# Patient Record
Sex: MALE | Race: WHITE
[De-identification: names, ages, dates, MRNs, and addresses within clinical notes are randomized per-mention and may not be internally consistent; named-entity substitution may affect disease eponyms.]

---

## 2017-04-07 ENCOUNTER — PRIOR ORIGINAL RECORDS (OUTPATIENT)
Dept: OTHER | Age: 77
End: 2017-04-07

## 2017-08-04 ENCOUNTER — PRIOR ORIGINAL RECORDS (OUTPATIENT)
Dept: OTHER | Age: 77
End: 2017-08-04

## 2017-12-08 ENCOUNTER — PRIOR ORIGINAL RECORDS (OUTPATIENT)
Dept: OTHER | Age: 77
End: 2017-12-08

## 2017-12-12 ENCOUNTER — PRIOR ORIGINAL RECORDS (OUTPATIENT)
Dept: OTHER | Age: 77
End: 2017-12-12

## 2017-12-20 LAB
BUN: 26 MG/DL
BUN: 30 MG/DL
CALCIUM: 9.6 MG/DL
CHLORIDE: 104 MEQ/L
CHLORIDE: 108 MEQ/L
CHOLESTEROL, TOTAL: 141 MG/DL
CHOLESTEROL, TOTAL: 161 MG/DL
CREATININE, SERUM: 1.44 MG/DL
CREATININE, SERUM: 1.5 MG/DL
GLUCOSE: 112 MG/DL
GLUCOSE: 115 MG/DL
HDL CHOLESTEROL: 33 MG/DL
HDL CHOLESTEROL: 37 MG/DL
HEMATOCRIT: 42 %
HEMATOCRIT: 46.1 %
HEMOGLOBIN A1C: 6.4 %
HEMOGLOBIN A1C: 6.7 %
HEMOGLOBIN: 13.9 G/DL
HEMOGLOBIN: 14.9 G/DL
LDL CHOLESTEROL: 77 MG/DL
MAGNESIUM: 1.9 MG/DL
MAGNESIUM: 2.1 MG/DL
PLATELETS: 241 K/UL
PLATELETS: 241 K/UL
POTASSIUM, SERUM: 4 MEQ/L
POTASSIUM, SERUM: 4.3 MEQ/L
RED BLOOD COUNT: 4.49 X 10-6/U
RED BLOOD COUNT: 4.79 X 10-6/U
SGOT (AST): 28 IU/L
SGOT (AST): 29 IU/L
SGPT (ALT): 40 IU/L
SGPT (ALT): 62 IU/L
SODIUM: 141 MEQ/L
SODIUM: 143 MEQ/L
THYROID STIMULATING HORMONE: 3.34 MLU/L
THYROID STIMULATING HORMONE: 5.23 MLU/L
TRIGLYCERIDES: 135 MG/DL
TRIGLYCERIDES: 352 MG/DL
URIC ACID: 6.6 MG/DL
VITAMIN D 25-OH: 33 NG/ML
WHITE BLOOD COUNT: 4.31 X 10-3/U
WHITE BLOOD COUNT: 4.54 X 10-3/U

## 2018-11-09 ENCOUNTER — PRIOR ORIGINAL RECORDS (OUTPATIENT)
Dept: OTHER | Age: 78
End: 2018-11-09

## 2018-12-14 ENCOUNTER — MYAURORA ACCOUNT LINK (OUTPATIENT)
Dept: OTHER | Age: 78
End: 2018-12-14

## 2018-12-14 ENCOUNTER — PRIOR ORIGINAL RECORDS (OUTPATIENT)
Dept: OTHER | Age: 78
End: 2018-12-14

## 2018-12-31 LAB
BUN: 24 MG/DL
CHLORIDE: 105 MEQ/L
CREATININE, SERUM: 1.46 MG/DL
GLUCOSE: 105 MG/DL
HEMATOCRIT: 45.3 %
HEMOGLOBIN: 15 G/DL
MAGNESIUM: 2 MG/DL
PLATELETS: 242 K/UL
POTASSIUM, SERUM: 3.8 MEQ/L
RED BLOOD COUNT: 4.73 X 10-6/U
SODIUM: 141 MEQ/L
WHITE BLOOD COUNT: 5.2 X 10-3/U

## 2019-02-28 VITALS
WEIGHT: 261 LBS | HEART RATE: 68 BPM | DIASTOLIC BLOOD PRESSURE: 60 MMHG | HEIGHT: 73 IN | SYSTOLIC BLOOD PRESSURE: 152 MMHG | BODY MASS INDEX: 34.59 KG/M2

## 2019-02-28 VITALS
DIASTOLIC BLOOD PRESSURE: 78 MMHG | WEIGHT: 248 LBS | SYSTOLIC BLOOD PRESSURE: 136 MMHG | HEART RATE: 60 BPM | HEIGHT: 73 IN | BODY MASS INDEX: 32.87 KG/M2

## 2019-04-19 RX ORDER — METOPROLOL SUCCINATE 25 MG/1
TABLET, EXTENDED RELEASE ORAL
COMMUNITY
Start: 2017-10-10 | End: 2020-03-03 | Stop reason: SDUPTHER

## 2019-04-19 RX ORDER — ATORVASTATIN CALCIUM 20 MG/1
TABLET, FILM COATED ORAL
COMMUNITY
Start: 2016-12-09

## 2019-04-19 RX ORDER — CHLORAL HYDRATE 500 MG
CAPSULE ORAL
COMMUNITY
Start: 2011-03-04 | End: 2019-12-02 | Stop reason: ALTCHOICE

## 2019-04-19 RX ORDER — FENOFIBRATE 145 MG/1
TABLET, COATED ORAL
COMMUNITY
Start: 2011-10-14

## 2019-04-19 RX ORDER — AMLODIPINE BESYLATE 10 MG/1
TABLET ORAL
COMMUNITY
Start: 2018-02-07 | End: 2020-03-03 | Stop reason: SDUPTHER

## 2019-04-19 RX ORDER — LISINOPRIL AND HYDROCHLOROTHIAZIDE 20; 12.5 MG/1; MG/1
TABLET ORAL
COMMUNITY
Start: 2018-05-14 | End: 2019-10-13 | Stop reason: SDUPTHER

## 2019-04-19 RX ORDER — ASPIRIN 325 MG
TABLET ORAL
COMMUNITY
Start: 2010-02-05

## 2019-04-19 RX ORDER — HYDRALAZINE HYDROCHLORIDE 50 MG/1
TABLET, FILM COATED ORAL
COMMUNITY

## 2019-08-05 LAB
25(OH)D3+25(OH)D2 SERPL-MCNC: 40 NG/ML
ABSOLUTE IMMATURE GRANULOCYTES (OFFPRE24): NORMAL
ALBUMIN SERPL-MCNC: NORMAL G/DL
ALBUMIN/GLOB SERPL: NORMAL {RATIO}
ALP SERPL-CCNC: NORMAL U/L
ALT SERPL-CCNC: 43 U/L
ANION GAP SERPL CALC-SCNC: NORMAL MMOL/L
AST SERPL-CCNC: 25 U/L
BASO+EOS+MONOS # BLD: NORMAL 10*3/UL
BASO+EOS+MONOS NFR BLD: NORMAL %
BASOPHILS # BLD: NORMAL 10*3/UL
BASOPHILS NFR BLD: NORMAL %
BILIRUB SERPL-MCNC: NORMAL MG/DL
BUN SERPL-MCNC: 23 MG/DL
BUN/CREAT SERPL: NORMAL
CALCIUM SERPL-MCNC: NORMAL MG/DL
CHLORIDE SERPL-SCNC: 106 MMOL/L
CHOLEST SERPL-MCNC: 186 MG/DL
CHOLEST/HDLC SERPL: NORMAL {RATIO}
CO2 SERPL-SCNC: NORMAL MMOL/L
CREAT SERPL-MCNC: 1.35 MG/DL
DIFFERENTIAL METHOD BLD: NORMAL
EOSINOPHIL # BLD: NORMAL 10*3/UL
EOSINOPHIL NFR BLD: NORMAL %
ERYTHROCYTE [DISTWIDTH] IN BLOOD: NORMAL %
EST. AVERAGE GLUCOSE BLD GHB EST-MCNC: NORMAL MG/DL
GLOBULIN SER-MCNC: NORMAL G/DL
GLUCOSE SERPL-MCNC: 107 MG/DL
HBA1C MFR BLD: 6.7 %
HBA1C MFR BLD: NORMAL % (ref 4.5–5.6)
HCT VFR BLD CALC: 44.8 %
HDLC SERPL-MCNC: 35 MG/DL
HGB BLD-MCNC: 14.4 G/DL
IMMATURE GRANULOCYTES (OFFPRE25): NORMAL
LDLC SERPL CALC-MCNC: 101 MG/DL
LENGTH OF FAST TIME PATIENT: NORMAL H
LENGTH OF FAST TIME PATIENT: NORMAL H
LYMPHOCYTES # BLD: NORMAL 10*3/UL
LYMPHOCYTES NFR BLD: NORMAL %
MAGNESIUM SERPL-MCNC: 2.1 MG/DL
MCH RBC QN AUTO: NORMAL PG
MCHC RBC AUTO-ENTMCNC: NORMAL G/DL
MCV RBC AUTO: NORMAL FL
MONOCYTES # BLD: NORMAL 10*3/UL
MONOCYTES NFR BLD: NORMAL %
MPV (OFFPRE2): NORMAL
NEUTROPHILS # BLD: NORMAL 10*3/UL
NEUTROPHILS NFR BLD: NORMAL %
NONHDLC SERPL-MCNC: NORMAL MG/DL
NRBC BLD MANUAL-RTO: NORMAL %
PLAT MORPH BLD: NORMAL
PLATELET # BLD: 267 10*3/UL
POTASSIUM SERPL-SCNC: 4.2 MMOL/L
PROT SERPL-MCNC: NORMAL G/DL
RBC # BLD: 4.71 10*6/UL
RBC MORPH BLD: NORMAL
SODIUM SERPL-SCNC: 137 MMOL/L
T4 FREE SERPL-MCNC: 0.8 NG/DL
TRIGL SERPL-MCNC: 249 MG/DL
TSH SERPL-ACNC: 4.97 M[IU]/L
URATE SERPL-MCNC: 5.7 MG/DL
VLDLC SERPL CALC-MCNC: NORMAL MG/DL
WBC # BLD: 5.25 10*3/UL
WBC MORPH BLD: NORMAL

## 2019-10-17 RX ORDER — LISINOPRIL AND HYDROCHLOROTHIAZIDE 20; 12.5 MG/1; MG/1
TABLET ORAL
Qty: 180 TABLET | Refills: 0 | Status: SHIPPED | OUTPATIENT
Start: 2019-10-17 | End: 2019-10-21 | Stop reason: SDUPTHER

## 2019-10-22 RX ORDER — LISINOPRIL AND HYDROCHLOROTHIAZIDE 20; 12.5 MG/1; MG/1
TABLET ORAL
Qty: 180 TABLET | Refills: 0 | Status: SHIPPED | OUTPATIENT
Start: 2019-10-22 | End: 2020-03-03 | Stop reason: SDUPTHER

## 2019-12-16 ENCOUNTER — APPOINTMENT (OUTPATIENT)
Dept: CARDIOLOGY | Age: 79
End: 2019-12-16

## 2019-12-16 ENCOUNTER — OFFICE VISIT (OUTPATIENT)
Dept: CARDIOLOGY | Age: 79
End: 2019-12-16

## 2019-12-16 VITALS
BODY MASS INDEX: 34.06 KG/M2 | WEIGHT: 257 LBS | DIASTOLIC BLOOD PRESSURE: 66 MMHG | HEART RATE: 72 BPM | HEIGHT: 73 IN | SYSTOLIC BLOOD PRESSURE: 136 MMHG

## 2019-12-16 DIAGNOSIS — E78.2 MIXED HYPERLIPIDEMIA: Primary | ICD-10-CM

## 2019-12-16 DIAGNOSIS — I10 ESSENTIAL HYPERTENSION, BENIGN: ICD-10-CM

## 2019-12-16 DIAGNOSIS — Z95.5 S/P PRIMARY ANGIOPLASTY WITH CORONARY STENT: ICD-10-CM

## 2019-12-16 DIAGNOSIS — I25.10 CORONARY ARTERY DISEASE INVOLVING NATIVE HEART WITHOUT ANGINA PECTORIS, UNSPECIFIED VESSEL OR LESION TYPE: ICD-10-CM

## 2019-12-16 PROCEDURE — 99214 OFFICE O/P EST MOD 30 MIN: CPT | Performed by: INTERNAL MEDICINE

## 2019-12-16 RX ORDER — ECHINACEA 400 MG
1000 CAPSULE ORAL
COMMUNITY

## 2019-12-16 RX ORDER — GLUCOSAM/CHONDRO/HERB 149/HYAL 750-100 MG
1200 TABLET ORAL
COMMUNITY

## 2019-12-16 RX ORDER — LEVOTHYROXINE SODIUM 0.03 MG/1
25 TABLET ORAL
COMMUNITY

## 2019-12-16 RX ORDER — MULTIVITAMIN
TABLET ORAL
COMMUNITY
Start: 2012-07-25

## 2019-12-16 RX ORDER — EZETIMIBE 10 MG/1
10 TABLET ORAL DAILY
Qty: 90 TABLET | Refills: 3 | Status: SHIPPED | OUTPATIENT
Start: 2019-12-16 | End: 2020-12-21

## 2019-12-16 SDOH — SOCIAL STABILITY: SOCIAL NETWORK: ARE YOU MARRIED, WIDOWED, DIVORCED, SEPARATED, NEVER MARRIED, OR LIVING WITH A PARTNER?: MARRIED

## 2019-12-16 SDOH — HEALTH STABILITY: PHYSICAL HEALTH: ON AVERAGE, HOW MANY MINUTES DO YOU ENGAGE IN EXERCISE AT THIS LEVEL?: 60 MIN

## 2019-12-16 SDOH — HEALTH STABILITY: PHYSICAL HEALTH: ON AVERAGE, HOW MANY DAYS PER WEEK DO YOU ENGAGE IN MODERATE TO STRENUOUS EXERCISE (LIKE A BRISK WALK)?: 7 DAYS

## 2019-12-16 ASSESSMENT — ENCOUNTER SYMPTOMS
COUGH: 0
FEVER: 0
SUSPICIOUS LESIONS: 0
HEMOPTYSIS: 0
ALLERGIC/IMMUNOLOGIC COMMENTS: NO NEW FOOD ALLERGIES
WEIGHT LOSS: 0
WEIGHT GAIN: 0
BRUISES/BLEEDS EASILY: 0
HEMATOCHEZIA: 0
CHILLS: 0

## 2019-12-30 ENCOUNTER — CLINICAL ABSTRACT (OUTPATIENT)
Dept: CARDIOLOGY | Age: 79
End: 2019-12-30

## 2020-01-01 ENCOUNTER — EXTERNAL RECORD (OUTPATIENT)
Dept: HEALTH INFORMATION MANAGEMENT | Facility: OTHER | Age: 80
End: 2020-01-01

## 2020-03-04 RX ORDER — LISINOPRIL AND HYDROCHLOROTHIAZIDE 20; 12.5 MG/1; MG/1
TABLET ORAL
Qty: 180 TABLET | Refills: 3 | Status: SHIPPED | OUTPATIENT
Start: 2020-03-04

## 2020-03-04 RX ORDER — AMLODIPINE BESYLATE 10 MG/1
TABLET ORAL
Qty: 90 TABLET | Refills: 3 | Status: SHIPPED | OUTPATIENT
Start: 2020-03-04 | End: 2021-03-15

## 2020-03-04 RX ORDER — METOPROLOL SUCCINATE 25 MG/1
TABLET, EXTENDED RELEASE ORAL
Qty: 90 TABLET | Refills: 3 | Status: SHIPPED | OUTPATIENT
Start: 2020-03-04 | End: 2021-04-28

## 2020-12-04 ENCOUNTER — TELEPHONE (OUTPATIENT)
Dept: CARDIOLOGY | Age: 80
End: 2020-12-04

## 2020-12-04 ENCOUNTER — OFFICE VISIT (OUTPATIENT)
Dept: CARDIOLOGY | Age: 80
End: 2020-12-04

## 2020-12-04 VITALS
SYSTOLIC BLOOD PRESSURE: 142 MMHG | HEIGHT: 73 IN | DIASTOLIC BLOOD PRESSURE: 64 MMHG | BODY MASS INDEX: 35.39 KG/M2 | WEIGHT: 267 LBS | HEART RATE: 85 BPM

## 2020-12-04 DIAGNOSIS — E78.5 DYSLIPIDEMIA: ICD-10-CM

## 2020-12-04 DIAGNOSIS — I25.10 CORONARY ARTERY DISEASE INVOLVING NATIVE HEART WITHOUT ANGINA PECTORIS, UNSPECIFIED VESSEL OR LESION TYPE: Primary | ICD-10-CM

## 2020-12-04 DIAGNOSIS — I10 ESSENTIAL HYPERTENSION, BENIGN: ICD-10-CM

## 2020-12-04 DIAGNOSIS — Z95.5 S/P PRIMARY ANGIOPLASTY WITH CORONARY STENT: ICD-10-CM

## 2020-12-04 PROCEDURE — 99214 OFFICE O/P EST MOD 30 MIN: CPT | Performed by: INTERNAL MEDICINE

## 2020-12-04 SDOH — SOCIAL STABILITY: SOCIAL NETWORK: ARE YOU MARRIED, WIDOWED, DIVORCED, SEPARATED, NEVER MARRIED, OR LIVING WITH A PARTNER?: MARRIED

## 2020-12-04 SDOH — HEALTH STABILITY: PHYSICAL HEALTH: ON AVERAGE, HOW MANY MINUTES DO YOU ENGAGE IN EXERCISE AT THIS LEVEL?: 60 MIN

## 2020-12-04 SDOH — HEALTH STABILITY: PHYSICAL HEALTH: ON AVERAGE, HOW MANY DAYS PER WEEK DO YOU ENGAGE IN MODERATE TO STRENUOUS EXERCISE (LIKE A BRISK WALK)?: 7 DAYS

## 2020-12-04 ASSESSMENT — PATIENT HEALTH QUESTIONNAIRE - PHQ9
CLINICAL INTERPRETATION OF PHQ2 SCORE: NO FURTHER SCREENING NEEDED
1. LITTLE INTEREST OR PLEASURE IN DOING THINGS: NOT AT ALL
2. FEELING DOWN, DEPRESSED OR HOPELESS: NOT AT ALL
SUM OF ALL RESPONSES TO PHQ9 QUESTIONS 1 AND 2: 0
CLINICAL INTERPRETATION OF PHQ9 SCORE: NO FURTHER SCREENING NEEDED
SUM OF ALL RESPONSES TO PHQ9 QUESTIONS 1 AND 2: 0

## 2020-12-04 ASSESSMENT — ENCOUNTER SYMPTOMS
ALLERGIC/IMMUNOLOGIC COMMENTS: NO NEW FOOD ALLERGIES
HEMOPTYSIS: 0
COUGH: 0
HEMATOCHEZIA: 0
WEIGHT LOSS: 0
BRUISES/BLEEDS EASILY: 0
CHILLS: 0
FEVER: 0
WEIGHT GAIN: 0
SUSPICIOUS LESIONS: 0

## 2020-12-09 ENCOUNTER — TELEPHONE (OUTPATIENT)
Dept: CARDIOLOGY | Age: 80
End: 2020-12-09

## 2020-12-21 RX ORDER — EZETIMIBE 10 MG/1
TABLET ORAL
Qty: 90 TABLET | Refills: 3 | Status: SHIPPED | OUTPATIENT
Start: 2020-12-21

## 2021-03-15 RX ORDER — AMLODIPINE BESYLATE 10 MG/1
TABLET ORAL
Qty: 90 TABLET | Refills: 0 | Status: SHIPPED | OUTPATIENT
Start: 2021-03-15 | End: 2021-06-14 | Stop reason: SDUPTHER

## 2021-04-28 RX ORDER — METOPROLOL SUCCINATE 25 MG/1
TABLET, EXTENDED RELEASE ORAL
Qty: 90 TABLET | Refills: 3 | Status: SHIPPED | OUTPATIENT
Start: 2021-04-28

## 2021-06-14 RX ORDER — AMLODIPINE BESYLATE 10 MG/1
10 TABLET ORAL DAILY
Qty: 90 TABLET | Refills: 2 | Status: SHIPPED | OUTPATIENT
Start: 2021-06-14

## 2021-12-10 ENCOUNTER — APPOINTMENT (OUTPATIENT)
Dept: CARDIOLOGY | Age: 81
End: 2021-12-10

## 2023-01-02 ENCOUNTER — APPOINTMENT (OUTPATIENT)
Dept: GENERAL RADIOLOGY | Facility: HOSPITAL | Age: 83
End: 2023-01-02
Payer: MEDICARE

## 2023-01-02 ENCOUNTER — HOSPITAL ENCOUNTER (INPATIENT)
Facility: HOSPITAL | Age: 83
LOS: 2 days | Discharge: HOME | End: 2023-01-04
Attending: EMERGENCY MEDICINE | Admitting: HOSPITALIST
Payer: MEDICARE

## 2023-01-02 ENCOUNTER — HOSPITAL ENCOUNTER (INPATIENT)
Facility: HOSPITAL | Age: 83
LOS: 2 days | Discharge: HOME | End: 2023-01-04
Attending: EMERGENCY MEDICINE
Payer: MEDICARE

## 2023-01-02 DIAGNOSIS — I47.1 SUSTAINED SVT (HCC): Primary | ICD-10-CM

## 2023-01-02 PROBLEM — I48.91 ATRIAL FIBRILLATION (HCC): Status: ACTIVE | Noted: 2023-01-02

## 2023-01-02 PROBLEM — I47.10 SUSTAINED SVT (HCC): Status: ACTIVE | Noted: 2023-01-02

## 2023-01-02 LAB
ALBUMIN SERPL-MCNC: 4.2 G/DL (ref 3.4–5)
ALBUMIN/GLOB SERPL: 1.2 {RATIO} (ref 1–2)
ALP LIVER SERPL-CCNC: 38 U/L
ALT SERPL-CCNC: 35 U/L
ANION GAP SERPL CALC-SCNC: 10 MMOL/L (ref 0–18)
ANION GAP SERPL CALC-SCNC: 6 MMOL/L (ref 0–18)
APTT PPP: 25.8 SECONDS (ref 23.3–35.6)
APTT PPP: 26.1 SECONDS (ref 23.3–35.6)
AST SERPL-CCNC: 31 U/L (ref 15–37)
BASOPHILS # BLD AUTO: 0.04 X10(3) UL (ref 0–0.2)
BASOPHILS NFR BLD AUTO: 0.7 %
BILIRUB SERPL-MCNC: 0.6 MG/DL (ref 0.1–2)
BILIRUB UR QL STRIP.AUTO: NEGATIVE
BUN BLD-MCNC: 21 MG/DL (ref 7–18)
BUN BLD-MCNC: 22 MG/DL (ref 7–18)
CALCIUM BLD-MCNC: 9.2 MG/DL (ref 8.5–10.1)
CALCIUM BLD-MCNC: 9.8 MG/DL (ref 8.5–10.1)
CHLORIDE SERPL-SCNC: 107 MMOL/L (ref 98–112)
CHLORIDE SERPL-SCNC: 110 MMOL/L (ref 98–112)
CLARITY UR REFRACT.AUTO: CLEAR
CO2 SERPL-SCNC: 24 MMOL/L (ref 21–32)
CO2 SERPL-SCNC: 26 MMOL/L (ref 21–32)
COLOR UR AUTO: YELLOW
CREAT BLD-MCNC: 1.41 MG/DL
CREAT BLD-MCNC: 1.51 MG/DL
EOSINOPHIL # BLD AUTO: 0.06 X10(3) UL (ref 0–0.7)
EOSINOPHIL NFR BLD AUTO: 1 %
ERYTHROCYTE [DISTWIDTH] IN BLOOD BY AUTOMATED COUNT: 12.5 %
ERYTHROCYTE [DISTWIDTH] IN BLOOD BY AUTOMATED COUNT: 12.5 %
EST. AVERAGE GLUCOSE BLD GHB EST-MCNC: 146 MG/DL (ref 68–126)
GFR SERPLBLD BASED ON 1.73 SQ M-ARVRAT: 46 ML/MIN/1.73M2 (ref 60–?)
GFR SERPLBLD BASED ON 1.73 SQ M-ARVRAT: 50 ML/MIN/1.73M2 (ref 60–?)
GLOBULIN PLAS-MCNC: 3.5 G/DL (ref 2.8–4.4)
GLUCOSE BLD-MCNC: 107 MG/DL (ref 70–99)
GLUCOSE BLD-MCNC: 118 MG/DL (ref 70–99)
GLUCOSE BLD-MCNC: 132 MG/DL (ref 70–99)
GLUCOSE BLD-MCNC: 138 MG/DL (ref 70–99)
GLUCOSE BLD-MCNC: 152 MG/DL (ref 70–99)
GLUCOSE UR STRIP.AUTO-MCNC: NEGATIVE MG/DL
HBA1C MFR BLD: 6.7 % (ref ?–5.7)
HCT VFR BLD AUTO: 39.7 %
HCT VFR BLD AUTO: 43.1 %
HGB BLD-MCNC: 13.2 G/DL
HGB BLD-MCNC: 14.8 G/DL
IMM GRANULOCYTES # BLD AUTO: 0.04 X10(3) UL (ref 0–1)
IMM GRANULOCYTES NFR BLD: 0.7 %
KETONES UR STRIP.AUTO-MCNC: NEGATIVE MG/DL
LEUKOCYTE ESTERASE UR QL STRIP.AUTO: NEGATIVE
LYMPHOCYTES # BLD AUTO: 1.93 X10(3) UL (ref 1–4)
LYMPHOCYTES NFR BLD AUTO: 33.4 %
MAGNESIUM SERPL-MCNC: 2.1 MG/DL (ref 1.6–2.6)
MCH RBC QN AUTO: 30.9 PG (ref 26–34)
MCH RBC QN AUTO: 31.4 PG (ref 26–34)
MCHC RBC AUTO-ENTMCNC: 33.2 G/DL (ref 31–37)
MCHC RBC AUTO-ENTMCNC: 34.3 G/DL (ref 31–37)
MCV RBC AUTO: 90 FL
MCV RBC AUTO: 94.3 FL
MONOCYTES # BLD AUTO: 0.59 X10(3) UL (ref 0.1–1)
MONOCYTES NFR BLD AUTO: 10.2 %
NEUTROPHILS # BLD AUTO: 3.12 X10 (3) UL (ref 1.5–7.7)
NEUTROPHILS # BLD AUTO: 3.12 X10(3) UL (ref 1.5–7.7)
NEUTROPHILS NFR BLD AUTO: 54 %
NITRITE UR QL STRIP.AUTO: NEGATIVE
OSMOLALITY SERPL CALC.SUM OF ELEC: 297 MOSM/KG (ref 275–295)
OSMOLALITY SERPL CALC.SUM OF ELEC: 298 MOSM/KG (ref 275–295)
PH UR STRIP.AUTO: 8.5 [PH] (ref 5–8)
PLATELET # BLD AUTO: 224 10(3)UL (ref 150–450)
PLATELET # BLD AUTO: 272 10(3)UL (ref 150–450)
POTASSIUM SERPL-SCNC: 3.6 MMOL/L (ref 3.5–5.1)
POTASSIUM SERPL-SCNC: 4.2 MMOL/L (ref 3.5–5.1)
PROT SERPL-MCNC: 7.7 G/DL (ref 6.4–8.2)
PROT UR STRIP.AUTO-MCNC: >=300 MG/DL
Q-T INTERVAL: 254 MS
QRS DURATION: 84 MS
QTC CALCULATION (BEZET): 444 MS
R AXIS: -13 DEGREES
RBC # BLD AUTO: 4.21 X10(6)UL
RBC # BLD AUTO: 4.79 X10(6)UL
RBC UR QL AUTO: NEGATIVE
SARS-COV-2 RNA RESP QL NAA+PROBE: NOT DETECTED
SODIUM SERPL-SCNC: 141 MMOL/L (ref 136–145)
SODIUM SERPL-SCNC: 142 MMOL/L (ref 136–145)
SP GR UR STRIP.AUTO: 1.02 (ref 1–1.03)
T AXIS: 136 DEGREES
T4 FREE SERPL-MCNC: 0.8 NG/DL (ref 0.8–1.7)
TROPONIN I HIGH SENSITIVITY: 49 NG/L
TROPONIN I HIGH SENSITIVITY: 55 NG/L
TSI SER-ACNC: 8.91 MIU/ML (ref 0.36–3.74)
UROBILINOGEN UR STRIP.AUTO-MCNC: 0.2 MG/DL
VENTRICULAR RATE: 184 BPM
WBC # BLD AUTO: 5.8 X10(3) UL (ref 4–11)
WBC # BLD AUTO: 6.6 X10(3) UL (ref 4–11)

## 2023-01-02 PROCEDURE — 99223 1ST HOSP IP/OBS HIGH 75: CPT | Performed by: STUDENT IN AN ORGANIZED HEALTH CARE EDUCATION/TRAINING PROGRAM

## 2023-01-02 PROCEDURE — 71045 X-RAY EXAM CHEST 1 VIEW: CPT | Performed by: EMERGENCY MEDICINE

## 2023-01-02 RX ORDER — ONDANSETRON 2 MG/ML
4 INJECTION INTRAMUSCULAR; INTRAVENOUS EVERY 6 HOURS PRN
Status: DISCONTINUED | OUTPATIENT
Start: 2023-01-02 | End: 2023-01-04

## 2023-01-02 RX ORDER — GUAIFENESIN 600 MG/1
600 TABLET, EXTENDED RELEASE ORAL 2 TIMES DAILY PRN
Status: DISCONTINUED | OUTPATIENT
Start: 2023-01-02 | End: 2023-01-04

## 2023-01-02 RX ORDER — HYDRALAZINE HYDROCHLORIDE 50 MG/1
50 TABLET, FILM COATED ORAL 2 TIMES DAILY
Status: DISCONTINUED | OUTPATIENT
Start: 2023-01-02 | End: 2023-01-04

## 2023-01-02 RX ORDER — ADENOSINE 3 MG/ML
12 INJECTION, SOLUTION INTRAVENOUS ONCE
Status: COMPLETED | OUTPATIENT
Start: 2023-01-02 | End: 2023-01-02

## 2023-01-02 RX ORDER — NICOTINE POLACRILEX 4 MG
15 LOZENGE BUCCAL
Status: DISCONTINUED | OUTPATIENT
Start: 2023-01-02 | End: 2023-01-04

## 2023-01-02 RX ORDER — LISINOPRIL AND HYDROCHLOROTHIAZIDE 20; 12.5 MG/1; MG/1
2 TABLET ORAL DAILY
Status: DISCONTINUED | OUTPATIENT
Start: 2023-01-02 | End: 2023-01-02

## 2023-01-02 RX ORDER — MELATONIN
3 NIGHTLY PRN
Status: DISCONTINUED | OUTPATIENT
Start: 2023-01-02 | End: 2023-01-04

## 2023-01-02 RX ORDER — ACETAMINOPHEN 325 MG/1
650 TABLET ORAL EVERY 4 HOURS PRN
Status: DISCONTINUED | OUTPATIENT
Start: 2023-01-02 | End: 2023-01-04

## 2023-01-02 RX ORDER — HYDRALAZINE HYDROCHLORIDE 25 MG/1
25 TABLET, FILM COATED ORAL 2 TIMES DAILY
Status: DISCONTINUED | OUTPATIENT
Start: 2023-01-02 | End: 2023-01-02

## 2023-01-02 RX ORDER — HEPARIN SODIUM 1000 [USP'U]/ML
5000 INJECTION, SOLUTION INTRAVENOUS; SUBCUTANEOUS ONCE
Status: COMPLETED | OUTPATIENT
Start: 2023-01-02 | End: 2023-01-02

## 2023-01-02 RX ORDER — METOPROLOL TARTRATE 50 MG/1
50 TABLET, FILM COATED ORAL
Status: DISCONTINUED | OUTPATIENT
Start: 2023-01-02 | End: 2023-01-04

## 2023-01-02 RX ORDER — BISACODYL 10 MG
10 SUPPOSITORY, RECTAL RECTAL
Status: DISCONTINUED | OUTPATIENT
Start: 2023-01-02 | End: 2023-01-04

## 2023-01-02 RX ORDER — LISINOPRIL AND HYDROCHLOROTHIAZIDE 20; 12.5 MG/1; MG/1
1 TABLET ORAL 2 TIMES DAILY
Status: DISCONTINUED | OUTPATIENT
Start: 2023-01-02 | End: 2023-01-02 | Stop reason: RX

## 2023-01-02 RX ORDER — LISINOPRIL AND HYDROCHLOROTHIAZIDE 20; 12.5 MG/1; MG/1
1 TABLET ORAL 2 TIMES DAILY
COMMUNITY

## 2023-01-02 RX ORDER — POLYETHYLENE GLYCOL 3350 17 G/17G
17 POWDER, FOR SOLUTION ORAL DAILY PRN
Status: DISCONTINUED | OUTPATIENT
Start: 2023-01-02 | End: 2023-01-04

## 2023-01-02 RX ORDER — ATORVASTATIN CALCIUM 20 MG/1
20 TABLET, FILM COATED ORAL NIGHTLY
Status: DISCONTINUED | OUTPATIENT
Start: 2023-01-02 | End: 2023-01-04

## 2023-01-02 RX ORDER — LEVOTHYROXINE SODIUM 0.03 MG/1
25 TABLET ORAL
Status: DISCONTINUED | OUTPATIENT
Start: 2023-01-03 | End: 2023-01-04

## 2023-01-02 RX ORDER — METOCLOPRAMIDE HYDROCHLORIDE 5 MG/ML
10 INJECTION INTRAMUSCULAR; INTRAVENOUS EVERY 8 HOURS PRN
Status: DISCONTINUED | OUTPATIENT
Start: 2023-01-02 | End: 2023-01-04

## 2023-01-02 RX ORDER — HYDROCODONE BITARTRATE AND ACETAMINOPHEN 5; 325 MG/1; MG/1
2 TABLET ORAL EVERY 4 HOURS PRN
Status: DISCONTINUED | OUTPATIENT
Start: 2023-01-02 | End: 2023-01-04

## 2023-01-02 RX ORDER — ADENOSINE 3 MG/ML
6 INJECTION, SOLUTION INTRAVENOUS ONCE
Status: COMPLETED | OUTPATIENT
Start: 2023-01-02 | End: 2023-01-02

## 2023-01-02 RX ORDER — FENOFIBRATE 134 MG/1
134 CAPSULE ORAL
Status: DISCONTINUED | OUTPATIENT
Start: 2023-01-03 | End: 2023-01-04

## 2023-01-02 RX ORDER — NICOTINE POLACRILEX 4 MG
30 LOZENGE BUCCAL
Status: DISCONTINUED | OUTPATIENT
Start: 2023-01-02 | End: 2023-01-04

## 2023-01-02 RX ORDER — HYDROCODONE BITARTRATE AND ACETAMINOPHEN 5; 325 MG/1; MG/1
1 TABLET ORAL EVERY 4 HOURS PRN
Status: DISCONTINUED | OUTPATIENT
Start: 2023-01-02 | End: 2023-01-04

## 2023-01-02 RX ORDER — SODIUM CHLORIDE 9 MG/ML
INJECTION, SOLUTION INTRAVENOUS CONTINUOUS
Status: ACTIVE | OUTPATIENT
Start: 2023-01-02 | End: 2023-01-02

## 2023-01-02 RX ORDER — HYDRALAZINE HYDROCHLORIDE 50 MG/1
50 TABLET, FILM COATED ORAL 2 TIMES DAILY
COMMUNITY

## 2023-01-02 RX ORDER — HEPARIN SODIUM AND DEXTROSE 10000; 5 [USP'U]/100ML; G/100ML
1000 INJECTION INTRAVENOUS ONCE
Status: DISCONTINUED | OUTPATIENT
Start: 2023-01-02 | End: 2023-01-04

## 2023-01-02 RX ORDER — AMLODIPINE BESYLATE 5 MG/1
10 TABLET ORAL DAILY
Status: DISCONTINUED | OUTPATIENT
Start: 2023-01-03 | End: 2023-01-04

## 2023-01-02 RX ORDER — SENNOSIDES 8.6 MG
17.2 TABLET ORAL NIGHTLY PRN
Status: DISCONTINUED | OUTPATIENT
Start: 2023-01-02 | End: 2023-01-04

## 2023-01-02 RX ORDER — HEPARIN SODIUM AND DEXTROSE 10000; 5 [USP'U]/100ML; G/100ML
INJECTION INTRAVENOUS CONTINUOUS
Status: DISCONTINUED | OUTPATIENT
Start: 2023-01-02 | End: 2023-01-03

## 2023-01-02 RX ORDER — BENZONATATE 100 MG/1
200 CAPSULE ORAL 3 TIMES DAILY PRN
Status: DISCONTINUED | OUTPATIENT
Start: 2023-01-02 | End: 2023-01-04

## 2023-01-02 RX ORDER — ADENOSINE 3 MG/ML
INJECTION, SOLUTION INTRAVENOUS
Status: COMPLETED
Start: 2023-01-02 | End: 2023-01-02

## 2023-01-02 RX ORDER — SODIUM PHOSPHATE, DIBASIC AND SODIUM PHOSPHATE, MONOBASIC 7; 19 G/133ML; G/133ML
1 ENEMA RECTAL ONCE AS NEEDED
Status: DISCONTINUED | OUTPATIENT
Start: 2023-01-02 | End: 2023-01-04

## 2023-01-02 RX ORDER — ASPIRIN 81 MG/1
81 TABLET, CHEWABLE ORAL DAILY
Status: DISCONTINUED | OUTPATIENT
Start: 2023-01-03 | End: 2023-01-04

## 2023-01-02 RX ORDER — DEXTROSE MONOHYDRATE 25 G/50ML
50 INJECTION, SOLUTION INTRAVENOUS
Status: DISCONTINUED | OUTPATIENT
Start: 2023-01-02 | End: 2023-01-04

## 2023-01-02 NOTE — ED QUICK NOTES
Orders for admission, patient is aware of plan and ready to go upstairs. Any questions, please call ED RN Lea Guillen  at extension [phone]. Vaccinated?  Yes  Type of COVID test sent: Rapid - negative  COVID Suspicion level: None      Titratable drug(s) infusing: Cardizem   Rate:5 mg/hr    LOC at time of transport: A/Ox4    Other pertinent information: N/A    CIWA score= N/A  NIH score= N/A

## 2023-01-02 NOTE — ED INITIAL ASSESSMENT (HPI)
Pt presents to the ED with complaints of sudden onset feeling dizzy with chest pain radiating to back and tingling to both arms. Pt states he took a full strength aspirin. Pt states symptoms have not improved. Pt awake and alert, skin w/d,resps reg/unlabored.

## 2023-01-03 ENCOUNTER — APPOINTMENT (OUTPATIENT)
Dept: CV DIAGNOSTICS | Facility: HOSPITAL | Age: 83
End: 2023-01-03
Attending: NURSE PRACTITIONER
Payer: MEDICARE

## 2023-01-03 PROBLEM — I47.1 SVT (SUPRAVENTRICULAR TACHYCARDIA) (HCC): Status: ACTIVE | Noted: 2023-01-02

## 2023-01-03 PROBLEM — I47.10 SVT (SUPRAVENTRICULAR TACHYCARDIA) (HCC): Status: ACTIVE | Noted: 2023-01-02

## 2023-01-03 LAB
ALBUMIN SERPL-MCNC: 3.7 G/DL (ref 3.4–5)
ALBUMIN/GLOB SERPL: 1.1 {RATIO} (ref 1–2)
ALP LIVER SERPL-CCNC: 26 U/L
ALT SERPL-CCNC: 33 U/L
ANION GAP SERPL CALC-SCNC: 3 MMOL/L (ref 0–18)
APTT PPP: 35 SECONDS (ref 23.3–35.6)
APTT PPP: 42.7 SECONDS (ref 23.3–35.6)
APTT PPP: 43.2 SECONDS (ref 23.3–35.6)
AST SERPL-CCNC: 25 U/L (ref 15–37)
ATRIAL RATE: 47 BPM
BASOPHILS # BLD AUTO: 0.04 X10(3) UL (ref 0–0.2)
BASOPHILS NFR BLD AUTO: 0.7 %
BILIRUB SERPL-MCNC: 0.5 MG/DL (ref 0.1–2)
BUN BLD-MCNC: 20 MG/DL (ref 7–18)
CALCIUM BLD-MCNC: 8.9 MG/DL (ref 8.5–10.1)
CHLORIDE SERPL-SCNC: 111 MMOL/L (ref 98–112)
CO2 SERPL-SCNC: 25 MMOL/L (ref 21–32)
CREAT BLD-MCNC: 1.31 MG/DL
EOSINOPHIL # BLD AUTO: 0.09 X10(3) UL (ref 0–0.7)
EOSINOPHIL NFR BLD AUTO: 1.7 %
ERYTHROCYTE [DISTWIDTH] IN BLOOD BY AUTOMATED COUNT: 12.6 %
GFR SERPLBLD BASED ON 1.73 SQ M-ARVRAT: 55 ML/MIN/1.73M2 (ref 60–?)
GLOBULIN PLAS-MCNC: 3.3 G/DL (ref 2.8–4.4)
GLUCOSE BLD-MCNC: 115 MG/DL (ref 70–99)
GLUCOSE BLD-MCNC: 117 MG/DL (ref 70–99)
GLUCOSE BLD-MCNC: 120 MG/DL (ref 70–99)
GLUCOSE BLD-MCNC: 138 MG/DL (ref 70–99)
GLUCOSE BLD-MCNC: 151 MG/DL (ref 70–99)
HCT VFR BLD AUTO: 38 %
HGB BLD-MCNC: 12.7 G/DL
IMM GRANULOCYTES # BLD AUTO: 0.04 X10(3) UL (ref 0–1)
IMM GRANULOCYTES NFR BLD: 0.7 %
LYMPHOCYTES # BLD AUTO: 1.34 X10(3) UL (ref 1–4)
LYMPHOCYTES NFR BLD AUTO: 24.8 %
MCH RBC QN AUTO: 31.4 PG (ref 26–34)
MCHC RBC AUTO-ENTMCNC: 33.4 G/DL (ref 31–37)
MCV RBC AUTO: 94.1 FL
MONOCYTES # BLD AUTO: 0.49 X10(3) UL (ref 0.1–1)
MONOCYTES NFR BLD AUTO: 9.1 %
NEUTROPHILS # BLD AUTO: 3.4 X10 (3) UL (ref 1.5–7.7)
NEUTROPHILS # BLD AUTO: 3.4 X10(3) UL (ref 1.5–7.7)
NEUTROPHILS NFR BLD AUTO: 63 %
OSMOLALITY SERPL CALC.SUM OF ELEC: 293 MOSM/KG (ref 275–295)
P AXIS: 70 DEGREES
P-R INTERVAL: 244 MS
PLATELET # BLD AUTO: 233 10(3)UL (ref 150–450)
PLATELET # BLD AUTO: 233 10(3)UL (ref 150–450)
POTASSIUM SERPL-SCNC: 3.5 MMOL/L (ref 3.5–5.1)
PROT SERPL-MCNC: 7 G/DL (ref 6.4–8.2)
Q-T INTERVAL: 472 MS
QRS DURATION: 102 MS
QTC CALCULATION (BEZET): 417 MS
R AXIS: -19 DEGREES
RBC # BLD AUTO: 4.04 X10(6)UL
SODIUM SERPL-SCNC: 139 MMOL/L (ref 136–145)
T AXIS: 42 DEGREES
VENTRICULAR RATE: 47 BPM
WBC # BLD AUTO: 5.4 X10(3) UL (ref 4–11)

## 2023-01-03 PROCEDURE — 93306 TTE W/DOPPLER COMPLETE: CPT | Performed by: NURSE PRACTITIONER

## 2023-01-03 PROCEDURE — 93017 CV STRESS TEST TRACING ONLY: CPT | Performed by: NURSE PRACTITIONER

## 2023-01-03 PROCEDURE — 93018 CV STRESS TEST I&R ONLY: CPT | Performed by: NURSE PRACTITIONER

## 2023-01-03 PROCEDURE — 78452 HT MUSCLE IMAGE SPECT MULT: CPT | Performed by: NURSE PRACTITIONER

## 2023-01-03 PROCEDURE — 99232 SBSQ HOSP IP/OBS MODERATE 35: CPT | Performed by: STUDENT IN AN ORGANIZED HEALTH CARE EDUCATION/TRAINING PROGRAM

## 2023-01-03 RX ORDER — HYDRALAZINE HYDROCHLORIDE 20 MG/ML
5 INJECTION INTRAMUSCULAR; INTRAVENOUS ONCE
Status: COMPLETED | OUTPATIENT
Start: 2023-01-03 | End: 2023-01-03

## 2023-01-03 RX ORDER — HEPARIN SODIUM 1000 [USP'U]/ML
3000 INJECTION, SOLUTION INTRAVENOUS; SUBCUTANEOUS ONCE
Status: COMPLETED | OUTPATIENT
Start: 2023-01-03 | End: 2023-01-03

## 2023-01-03 RX ORDER — ASPIRIN 81 MG/1
81 TABLET, CHEWABLE ORAL DAILY
Refills: 0 | Status: SHIPPED | COMMUNITY
Start: 2023-01-04

## 2023-01-03 NOTE — PLAN OF CARE
Assumed care of pt at [address] [facility]. A&Ox4. Remains on room air, denies sob. Lungs diminished bilaterally. Sinus bradycardia, heart rates 40s-50s. Pt denies cp. Heparin gtt infusing as ordered. Mild tenderness to bilateral ankles. Continent, voiding in urinal. Denies pain. Ambulating with standby assist. Pt updated and agrees with plan of care.  Plan: stress test, treadmill diet/no caffeine, heparin gtt    Problem: Patient/Family Goals  Goal: Patient/Family Long Term Goal  Description: Patient's Long Term Goal: Be healthy    Interventions:  - follow up appts with mds after discharge  - adhere to medication regimen  - adequate diet and hydration  - diabetes management- monitor sugars, carb restriction    - See additional Care Plan goals for specific interventions  Outcome: Progressing  Goal: Patient/Family Short Term Goal  Description: Patient's Short Term Goal: Discharge    Interventions:   - lexiscan stress test  - heparin gtt per acs/afib protocol  - qid accuchecks  - telemetry monitoring  - vitals q 4hrs    - See additional Care Plan goals for specific interventions  Outcome: Progressing

## 2023-01-03 NOTE — PLAN OF CARE
Assumed care at 0730. Pt alert, oriented x4. Oxygen saturation maintained greater than 89% on room air. Lung sounds clear bilaterally. Tele: normal sinus rhythm, sinus bradycardia at times, rates in 50s, 1st degree A-V block. Pt notified this RN of mild chest pain which occurred around 0600 this AM per patient. Cardiology APRN notified, EKG completed. Heparin gtt maintained per protocol. Continent. Denies pain. Plan of care: stress test today, heparin gtt per ACS/AFIB protocol, echo to be completed. Patient updated on plan of care. Questions answered. 1900 - Pt cleared to discharge per cardiology and hospitalist. Pt and spouse refusing discharge, wanting to speak to physician.  Cardiology aware, will round in AM.     Problem: Diabetes/Glucose Control  Goal: Glucose maintained within prescribed range  Description: INTERVENTIONS:  - Monitor Blood Glucose as ordered  - Assess for signs and symptoms of hyperglycemia and hypoglycemia  - Administer ordered medications to maintain glucose within target range  - Assess barriers to adequate nutritional intake and initiate nutrition consult as needed  - Instruct patient on self management of diabetes  Outcome: Progressing     Problem: Patient/Family Goals  Goal: Patient/Family Long Term Goal  Description: Patient's Long Term Goal: Be healthy    Interventions:  - follow up appts with mds after discharge  - adhere to medication regimen  - adequate diet and hydration  - diabetes management- monitor sugars, carb restriction    - See additional Care Plan goals for specific interventions  Outcome: Progressing  Goal: Patient/Family Short Term Goal  Description: Patient's Short Term Goal: Discharge    Interventions:   - lexiscan stress test  - heparin gtt per acs/afib protocol  - qid accuchecks  - telemetry monitoring  - vitals q 4hrs    - See additional Care Plan goals for specific interventions  Outcome: Progressing

## 2023-01-03 NOTE — PLAN OF CARE
NURSING ADMISSION NOTE      Patient admitted via cart. Oriented to room. Safety precautions initiated. Bed in low position. Call light in reach. Pt HR in the 50s when he arrived to the unit, Cardizem gtts turn off cards APN made aware.

## 2023-01-04 ENCOUNTER — APPOINTMENT (OUTPATIENT)
Dept: ULTRASOUND IMAGING | Facility: HOSPITAL | Age: 83
End: 2023-01-04
Attending: HOSPITALIST
Payer: MEDICARE

## 2023-01-04 VITALS
WEIGHT: 256.38 LBS | TEMPERATURE: 98 F | RESPIRATION RATE: 18 BRPM | BODY MASS INDEX: 34.72 KG/M2 | SYSTOLIC BLOOD PRESSURE: 144 MMHG | OXYGEN SATURATION: 95 % | DIASTOLIC BLOOD PRESSURE: 60 MMHG | HEART RATE: 67 BPM | HEIGHT: 72 IN

## 2023-01-04 LAB
ATRIAL RATE: 47 BPM
ATRIAL RATE: 57 BPM
GLUCOSE BLD-MCNC: 106 MG/DL (ref 70–99)
GLUCOSE BLD-MCNC: 125 MG/DL (ref 70–99)
P AXIS: 25 DEGREES
P AXIS: 70 DEGREES
P-R INTERVAL: 216 MS
P-R INTERVAL: 244 MS
PLATELET # BLD AUTO: 237 10(3)UL (ref 150–450)
Q-T INTERVAL: 460 MS
Q-T INTERVAL: 472 MS
QRS DURATION: 102 MS
QRS DURATION: 108 MS
QTC CALCULATION (BEZET): 417 MS
QTC CALCULATION (BEZET): 447 MS
R AXIS: -19 DEGREES
R AXIS: -32 DEGREES
T AXIS: 42 DEGREES
T AXIS: 44 DEGREES
VENTRICULAR RATE: 47 BPM
VENTRICULAR RATE: 57 BPM

## 2023-01-04 PROCEDURE — 93970 EXTREMITY STUDY: CPT | Performed by: HOSPITALIST

## 2023-01-04 PROCEDURE — 99239 HOSP IP/OBS DSCHRG MGMT >30: CPT | Performed by: HOSPITALIST

## 2023-01-04 RX ORDER — ENOXAPARIN SODIUM 100 MG/ML
40 INJECTION SUBCUTANEOUS DAILY
Status: DISCONTINUED | OUTPATIENT
Start: 2023-01-04 | End: 2023-01-04

## 2023-01-04 NOTE — PLAN OF CARE
After discharge patients wife requested call back from cardiology team.  Attempted to call twice, voice mail left, no answer.

## 2023-01-04 NOTE — PLAN OF CARE
Patient Aox4. Sating at 95% on room air. Lungs clear. Patient with no complaints of pain. SB on tele with 1st degree block. Up SBA due to chest discomfort on admission. Continent of bowels and bladder. Tolerated PO medications fine. MD at bedside to go over results from stress test. Patient verbalizes understanding. Order for STAT US BLE to rule out DVT. If okay,patient cleared for discharge.

## 2023-01-04 NOTE — PLAN OF CARE
Patient with orders for discharge. Per patient, he didn't want to wait for Dr. Antonio Godinez to see him even though nurse informed him that he could stay until Dr. Antonio Godinez did rounds. Wife upset that patient didn't want to stay until Dr. Antonio Godinez came. Patient and wife began to argue at bedside and nurse attempted to mediate situation. Patient stated that since he is the patient, it is his decision and he decided not to wait. Discharge instructions gone over with patient and wife who vocalize understanding. IV taken out.

## 2023-01-04 NOTE — PLAN OF CARE
Assumed care of pt at [address] [place] [address]. A&Ox4. Room air, denies sob. Sinus bradycardia with a 1st degree AV block (unchanged), rates as low as 37bpm while pt sleeps. denies cardiac symptoms. Continent, voiding freely. Denies pain. QID accuchecks. Ambulating independently in room. Expecting discharge after cardiology rounds. Pt and pt's spouse updated.     Problem: Patient/Family Goals  Goal: Patient/Family Long Term Goal  Description: Patient's Long Term Goal: Be healthy    Interventions:  - follow up appts with mds after discharge  - adhere to medication regimen  - adequate diet and hydration  - diabetes management- monitor sugars, carb restriction    - See additional Care Plan goals for specific interventions  Outcome: Progressing  Goal: Patient/Family Short Term Goal  Description: Patient's Short Term Goal: Discharge    Interventions:   - lexiscan stress test  - heparin gtt per acs/afib protocol  - qid accuchecks  - telemetry monitoring  - vitals q 4hrs    - See additional Care Plan goals for specific interventions  Outcome: Progressing

## 2024-09-25 ENCOUNTER — HOSPITAL ENCOUNTER (OUTPATIENT)
Dept: LAB | Facility: HOSPITAL | Age: 84
Discharge: HOME | End: 2024-09-25
Attending: NURSE PRACTITIONER
Payer: MEDICARE

## 2024-09-25 LAB
ANION GAP SERPL CALC-SCNC: 7 MMOL/L (ref 0–18)
BUN BLD-MCNC: 30 MG/DL (ref 9–23)
CALCIUM BLD-MCNC: 10.4 MG/DL (ref 8.7–10.4)
CHLORIDE SERPL-SCNC: 103 MMOL/L (ref 98–112)
CO2 SERPL-SCNC: 30 MMOL/L (ref 21–32)
CREAT BLD-MCNC: 1.64 MG/DL
EGFRCR SERPLBLD CKD-EPI 2021: 42 ML/MIN/1.73M2 (ref 60–?)
FASTING STATUS PATIENT QL REPORTED: NO
GLUCOSE BLD-MCNC: 142 MG/DL (ref 70–99)
OSMOLALITY SERPL CALC.SUM OF ELEC: 299 MOSM/KG (ref 275–295)
POTASSIUM SERPL-SCNC: 4.1 MMOL/L (ref 3.5–5.1)
SODIUM SERPL-SCNC: 140 MMOL/L (ref 136–145)

## 2024-09-25 PROCEDURE — 80048 BASIC METABOLIC PNL TOTAL CA: CPT | Performed by: NURSE PRACTITIONER

## 2024-09-25 PROCEDURE — 36415 COLL VENOUS BLD VENIPUNCTURE: CPT | Performed by: NURSE PRACTITIONER

## 2025-06-01 ENCOUNTER — APPOINTMENT (OUTPATIENT)
Dept: GENERAL RADIOLOGY | Facility: HOSPITAL | Age: 85
End: 2025-06-01
Attending: EMERGENCY MEDICINE
Payer: MEDICARE

## 2025-06-01 ENCOUNTER — APPOINTMENT (OUTPATIENT)
Dept: CT IMAGING | Facility: HOSPITAL | Age: 85
End: 2025-06-01
Attending: EMERGENCY MEDICINE
Payer: MEDICARE

## 2025-06-01 ENCOUNTER — HOSPITAL ENCOUNTER (INPATIENT)
Facility: HOSPITAL | Age: 85
LOS: 7 days | Discharge: HOME | End: 2025-06-09
Attending: EMERGENCY MEDICINE | Admitting: HOSPITALIST
Payer: MEDICARE

## 2025-06-01 DIAGNOSIS — J18.9 COMMUNITY ACQUIRED PNEUMONIA OF LEFT UPPER LOBE OF LUNG: Primary | ICD-10-CM

## 2025-06-01 DIAGNOSIS — J96.01 ACUTE HYPOXEMIC RESPIRATORY FAILURE (HCC): ICD-10-CM

## 2025-06-01 DIAGNOSIS — N17.9 AKI (ACUTE KIDNEY INJURY): ICD-10-CM

## 2025-06-01 DIAGNOSIS — A41.9 SEPSIS DUE TO PNEUMONIA (HCC): ICD-10-CM

## 2025-06-01 DIAGNOSIS — J18.9 SEPSIS DUE TO PNEUMONIA (HCC): ICD-10-CM

## 2025-06-01 DIAGNOSIS — R79.89 ELEVATED TROPONIN: ICD-10-CM

## 2025-06-01 LAB
ALBUMIN SERPL-MCNC: 4.9 G/DL (ref 3.2–4.8)
ALBUMIN/GLOB SERPL: 1.6 {RATIO} (ref 1–2)
ALP LIVER SERPL-CCNC: 35 U/L (ref 45–117)
ALT SERPL-CCNC: 53 U/L (ref 10–49)
ANION GAP SERPL CALC-SCNC: 14 MMOL/L (ref 0–18)
AST SERPL-CCNC: 88 U/L (ref ?–34)
BASOPHILS # BLD AUTO: 0.01 X10(3) UL (ref 0–0.2)
BASOPHILS NFR BLD AUTO: 0.1 %
BILIRUB SERPL-MCNC: 0.9 MG/DL (ref 0.2–1.1)
BUN BLD-MCNC: 42 MG/DL (ref 9–23)
CALCIUM BLD-MCNC: 9.4 MG/DL (ref 8.7–10.6)
CHLORIDE SERPL-SCNC: 95 MMOL/L (ref 98–112)
CHOLEST SERPL-MCNC: 84 MG/DL (ref ?–200)
CO2 SERPL-SCNC: 26 MMOL/L (ref 21–32)
CREAT BLD-MCNC: 2.43 MG/DL (ref 0.7–1.3)
EGFRCR SERPLBLD CKD-EPI 2021: 26 ML/MIN/1.73M2 (ref 60–?)
EOSINOPHIL # BLD AUTO: 0 X10(3) UL (ref 0–0.7)
EOSINOPHIL NFR BLD AUTO: 0 %
ERYTHROCYTE [DISTWIDTH] IN BLOOD BY AUTOMATED COUNT: 12.8 %
GLOBULIN PLAS-MCNC: 3 G/DL (ref 2–3.5)
GLUCOSE BLD-MCNC: 191 MG/DL (ref 70–99)
HCT VFR BLD AUTO: 35 % (ref 39–53)
HDLC SERPL-MCNC: 19 MG/DL (ref 40–59)
HGB BLD-MCNC: 11.8 G/DL (ref 13–17.5)
IMM GRANULOCYTES # BLD AUTO: 0.08 X10(3) UL (ref 0–1)
IMM GRANULOCYTES NFR BLD: 1.1 %
LACTATE SERPL-SCNC: 1.5 MMOL/L (ref 0.5–2)
LDLC SERPL CALC-MCNC: 41 MG/DL (ref ?–100)
LYMPHOCYTES # BLD AUTO: 0.32 X10(3) UL (ref 1–4)
LYMPHOCYTES NFR BLD AUTO: 4.3 %
MCH RBC QN AUTO: 30.4 PG (ref 26–34)
MCHC RBC AUTO-ENTMCNC: 33.7 G/DL (ref 31–37)
MCV RBC AUTO: 90.2 FL (ref 80–100)
MONOCYTES # BLD AUTO: 0.36 X10(3) UL (ref 0.1–1)
MONOCYTES NFR BLD AUTO: 4.9 %
NEUTROPHILS # BLD AUTO: 6.59 X10 (3) UL (ref 1.5–7.7)
NEUTROPHILS # BLD AUTO: 6.59 X10(3) UL (ref 1.5–7.7)
NEUTROPHILS NFR BLD AUTO: 89.6 %
NONHDLC SERPL-MCNC: 65 MG/DL (ref ?–130)
OSMOLALITY SERPL CALC.SUM OF ELEC: 296 MOSM/KG (ref 275–295)
PLATELET # BLD AUTO: 242 10(3)UL (ref 150–450)
POTASSIUM SERPL-SCNC: 3.6 MMOL/L (ref 3.5–5.1)
PROT SERPL-MCNC: 7.9 G/DL (ref 5.7–8.2)
RBC # BLD AUTO: 3.88 X10(6)UL (ref 3.8–5.8)
SODIUM SERPL-SCNC: 135 MMOL/L (ref 136–145)
TRIGL SERPL-MCNC: 134 MG/DL (ref 30–149)
TROPONIN I SERPL HS-MCNC: 119 NG/L (ref ?–53)
VLDLC SERPL CALC-MCNC: 19 MG/DL (ref 0–30)
WBC # BLD AUTO: 7.4 X10(3) UL (ref 4–11)

## 2025-06-01 PROCEDURE — 74176 CT ABD & PELVIS W/O CONTRAST: CPT | Performed by: EMERGENCY MEDICINE

## 2025-06-01 PROCEDURE — 71045 X-RAY EXAM CHEST 1 VIEW: CPT | Performed by: EMERGENCY MEDICINE

## 2025-06-01 RX ORDER — CARVEDILOL 12.5 MG/1
12.5 TABLET ORAL 2 TIMES DAILY WITH MEALS
COMMUNITY

## 2025-06-01 RX ORDER — EZETIMIBE 10 MG/1
10 TABLET ORAL NIGHTLY
COMMUNITY

## 2025-06-01 RX ORDER — ACETAMINOPHEN 500 MG
1000 TABLET ORAL ONCE
Status: COMPLETED | OUTPATIENT
Start: 2025-06-01 | End: 2025-06-01

## 2025-06-01 RX ORDER — METFORMIN HYDROCHLORIDE EXTENDED-RELEASE TABLETS 500 MG/1
500 TABLET, FILM COATED, EXTENDED RELEASE ORAL
COMMUNITY

## 2025-06-02 ENCOUNTER — APPOINTMENT (OUTPATIENT)
Dept: GENERAL RADIOLOGY | Facility: HOSPITAL | Age: 85
End: 2025-06-02
Attending: HOSPITALIST
Payer: MEDICARE

## 2025-06-02 PROBLEM — J96.01 ACUTE HYPOXEMIC RESPIRATORY FAILURE (HCC): Status: ACTIVE | Noted: 2025-06-02

## 2025-06-02 PROBLEM — N17.9 AKI (ACUTE KIDNEY INJURY): Status: ACTIVE | Noted: 2025-06-02

## 2025-06-02 LAB
ATRIAL RATE: 69 BPM
BILIRUB UR QL STRIP.AUTO: NEGATIVE
COLOR UR AUTO: YELLOW
EST. AVERAGE GLUCOSE BLD GHB EST-MCNC: 157 MG/DL (ref 68–126)
GLUCOSE BLD-MCNC: 157 MG/DL (ref 70–99)
GLUCOSE BLD-MCNC: 170 MG/DL (ref 70–99)
GLUCOSE BLD-MCNC: 201 MG/DL (ref 70–99)
GLUCOSE BLD-MCNC: 264 MG/DL (ref 70–99)
GLUCOSE UR STRIP.AUTO-MCNC: NORMAL MG/DL
HBA1C MFR BLD: 7.1 % (ref ?–5.7)
KETONES UR STRIP.AUTO-MCNC: NEGATIVE MG/DL
L PNEUMO AG UR QL: POSITIVE
LEUKOCYTE ESTERASE UR QL STRIP.AUTO: NEGATIVE
NITRITE UR QL STRIP.AUTO: NEGATIVE
P-R INTERVAL: 184 MS
PH UR STRIP.AUTO: 5.5 [PH] (ref 5–8)
PROT UR STRIP.AUTO-MCNC: 100 MG/DL
Q-T INTERVAL: 406 MS
QRS DURATION: 98 MS
QTC CALCULATION (BEZET): 435 MS
R AXIS: -27 DEGREES
SP GR UR STRIP.AUTO: 1.02 (ref 1–1.03)
STREP PNEUMO ANTIGEN, URINE: NEGATIVE
T AXIS: 74 DEGREES
TROPONIN I SERPL HS-MCNC: 97 NG/L (ref ?–53)
UROBILINOGEN UR STRIP.AUTO-MCNC: 2 MG/DL
VENTRICULAR RATE: 69 BPM

## 2025-06-02 PROCEDURE — 71045 X-RAY EXAM CHEST 1 VIEW: CPT | Performed by: HOSPITALIST

## 2025-06-02 PROCEDURE — 99223 1ST HOSP IP/OBS HIGH 75: CPT | Performed by: INTERNAL MEDICINE

## 2025-06-02 PROCEDURE — 99223 1ST HOSP IP/OBS HIGH 75: CPT | Performed by: STUDENT IN AN ORGANIZED HEALTH CARE EDUCATION/TRAINING PROGRAM

## 2025-06-02 PROCEDURE — 5A0945A ASSISTANCE WITH RESPIRATORY VENTILATION, 24-96 CONSECUTIVE HOURS, HIGH NASAL FLOW/VELOCITY: ICD-10-PCS | Performed by: INTERNAL MEDICINE

## 2025-06-02 RX ORDER — SODIUM PHOSPHATE, DIBASIC AND SODIUM PHOSPHATE, MONOBASIC 7; 19 G/230ML; G/230ML
1 ENEMA RECTAL ONCE AS NEEDED
Status: DISCONTINUED | OUTPATIENT
Start: 2025-06-02 | End: 2025-06-09

## 2025-06-02 RX ORDER — HEPARIN SODIUM 5000 [USP'U]/ML
5000 INJECTION, SOLUTION INTRAVENOUS; SUBCUTANEOUS EVERY 12 HOURS SCHEDULED
Status: DISCONTINUED | OUTPATIENT
Start: 2025-06-02 | End: 2025-06-04

## 2025-06-02 RX ORDER — METHYLPREDNISOLONE SODIUM SUCCINATE 125 MG/2ML
125 INJECTION INTRAMUSCULAR; INTRAVENOUS ONCE
Status: COMPLETED | OUTPATIENT
Start: 2025-06-02 | End: 2025-06-02

## 2025-06-02 RX ORDER — IPRATROPIUM BROMIDE AND ALBUTEROL SULFATE 2.5; .5 MG/3ML; MG/3ML
3 SOLUTION RESPIRATORY (INHALATION)
Status: DISCONTINUED | OUTPATIENT
Start: 2025-06-02 | End: 2025-06-07

## 2025-06-02 RX ORDER — NICOTINE POLACRILEX 4 MG
30 LOZENGE BUCCAL
Status: DISCONTINUED | OUTPATIENT
Start: 2025-06-02 | End: 2025-06-09

## 2025-06-02 RX ORDER — EZETIMIBE 10 MG/1
10 TABLET ORAL NIGHTLY
Status: DISCONTINUED | OUTPATIENT
Start: 2025-06-02 | End: 2025-06-09

## 2025-06-02 RX ORDER — METHYLPREDNISOLONE SODIUM SUCCINATE 125 MG/2ML
INJECTION INTRAMUSCULAR; INTRAVENOUS
Status: DISPENSED
Start: 2025-06-02 | End: 2025-06-02

## 2025-06-02 RX ORDER — FENOFIBRATE 134 MG/1
134 CAPSULE ORAL
Status: DISCONTINUED | OUTPATIENT
Start: 2025-06-02 | End: 2025-06-09

## 2025-06-02 RX ORDER — ECHINACEA PURPUREA EXTRACT 125 MG
1 TABLET ORAL
Status: DISCONTINUED | OUTPATIENT
Start: 2025-06-02 | End: 2025-06-09

## 2025-06-02 RX ORDER — CARVEDILOL 12.5 MG/1
12.5 TABLET ORAL 2 TIMES DAILY WITH MEALS
Status: DISCONTINUED | OUTPATIENT
Start: 2025-06-02 | End: 2025-06-09

## 2025-06-02 RX ORDER — BISACODYL 10 MG
10 SUPPOSITORY, RECTAL RECTAL
Status: DISCONTINUED | OUTPATIENT
Start: 2025-06-02 | End: 2025-06-09

## 2025-06-02 RX ORDER — DEXTROSE MONOHYDRATE 25 G/50ML
50 INJECTION, SOLUTION INTRAVENOUS
Status: DISCONTINUED | OUTPATIENT
Start: 2025-06-02 | End: 2025-06-09

## 2025-06-02 RX ORDER — ONDANSETRON 2 MG/ML
4 INJECTION INTRAMUSCULAR; INTRAVENOUS EVERY 4 HOURS PRN
Status: ACTIVE | OUTPATIENT
Start: 2025-06-02 | End: 2025-06-02

## 2025-06-02 RX ORDER — POLYETHYLENE GLYCOL 3350 17 G/17G
17 POWDER, FOR SOLUTION ORAL DAILY PRN
Status: DISCONTINUED | OUTPATIENT
Start: 2025-06-02 | End: 2025-06-09

## 2025-06-02 RX ORDER — ASPIRIN 81 MG/1
81 TABLET, CHEWABLE ORAL DAILY
Status: DISCONTINUED | OUTPATIENT
Start: 2025-06-02 | End: 2025-06-09

## 2025-06-02 RX ORDER — FUROSEMIDE 10 MG/ML
20 INJECTION INTRAMUSCULAR; INTRAVENOUS ONCE
Status: COMPLETED | OUTPATIENT
Start: 2025-06-02 | End: 2025-06-02

## 2025-06-02 RX ORDER — GUAIFENESIN 600 MG/1
600 TABLET, EXTENDED RELEASE ORAL 2 TIMES DAILY
Status: DISCONTINUED | OUTPATIENT
Start: 2025-06-02 | End: 2025-06-09

## 2025-06-02 RX ORDER — ACETAMINOPHEN 500 MG
500 TABLET ORAL EVERY 4 HOURS PRN
Status: DISCONTINUED | OUTPATIENT
Start: 2025-06-02 | End: 2025-06-09

## 2025-06-02 RX ORDER — LEVOTHYROXINE SODIUM 88 UG/1
88 TABLET ORAL
Status: DISCONTINUED | OUTPATIENT
Start: 2025-06-03 | End: 2025-06-09

## 2025-06-02 RX ORDER — METOCLOPRAMIDE HYDROCHLORIDE 5 MG/ML
5 INJECTION INTRAMUSCULAR; INTRAVENOUS EVERY 8 HOURS PRN
Status: DISCONTINUED | OUTPATIENT
Start: 2025-06-02 | End: 2025-06-09

## 2025-06-02 RX ORDER — AZITHROMYCIN 250 MG/1
500 TABLET, FILM COATED ORAL
Status: DISCONTINUED | OUTPATIENT
Start: 2025-06-02 | End: 2025-06-03 | Stop reason: DRUGHIGH

## 2025-06-02 RX ORDER — METOCLOPRAMIDE HYDROCHLORIDE 5 MG/ML
10 INJECTION INTRAMUSCULAR; INTRAVENOUS EVERY 8 HOURS PRN
Status: DISCONTINUED | OUTPATIENT
Start: 2025-06-02 | End: 2025-06-02

## 2025-06-02 RX ORDER — ONDANSETRON 2 MG/ML
4 INJECTION INTRAMUSCULAR; INTRAVENOUS EVERY 6 HOURS PRN
Status: DISCONTINUED | OUTPATIENT
Start: 2025-06-02 | End: 2025-06-09

## 2025-06-02 RX ORDER — SENNOSIDES 8.6 MG
17.2 TABLET ORAL NIGHTLY PRN
Status: DISCONTINUED | OUTPATIENT
Start: 2025-06-02 | End: 2025-06-09

## 2025-06-02 RX ORDER — CHOLECALCIFEROL (VITAMIN D3) 50 MCG
5000 TABLET ORAL DAILY
Status: DISCONTINUED | OUTPATIENT
Start: 2025-06-02 | End: 2025-06-09

## 2025-06-02 RX ORDER — METHYLPREDNISOLONE SODIUM SUCCINATE 40 MG/ML
40 INJECTION INTRAMUSCULAR; INTRAVENOUS EVERY 8 HOURS
Status: DISCONTINUED | OUTPATIENT
Start: 2025-06-02 | End: 2025-06-03

## 2025-06-02 RX ORDER — LEVOTHYROXINE SODIUM 25 UG/1
25 TABLET ORAL
Status: DISCONTINUED | OUTPATIENT
Start: 2025-06-02 | End: 2025-06-02

## 2025-06-02 RX ORDER — ALBUTEROL SULFATE 0.83 MG/ML
2.5 SOLUTION RESPIRATORY (INHALATION) EVERY 4 HOURS PRN
Status: DISCONTINUED | OUTPATIENT
Start: 2025-06-02 | End: 2025-06-09

## 2025-06-02 RX ORDER — NICOTINE POLACRILEX 4 MG
15 LOZENGE BUCCAL
Status: DISCONTINUED | OUTPATIENT
Start: 2025-06-02 | End: 2025-06-09

## 2025-06-02 RX ORDER — ATORVASTATIN CALCIUM 20 MG/1
20 TABLET, FILM COATED ORAL NIGHTLY
Status: DISCONTINUED | OUTPATIENT
Start: 2025-06-02 | End: 2025-06-09

## 2025-06-02 NOTE — ED QUICK NOTES
Orders for admission, patient is aware of plan and ready to go upstairs. Any questions, please call ED RN Samanta at extension [phone].     Patient Covid vaccination status: Unvaccinated     COVID Test Ordered in ED: None    COVID Suspicion at Admission: N/A    Running Infusions: Medication Infusions[1]     Mental Status/LOC at time of transport: A/Ox4    Other pertinent information:   CIWA score: N/A   NIH score:  N/A             [1]

## 2025-06-02 NOTE — PHYSICAL THERAPY NOTE
Order received, chart reviewed. Pt with increasing O2 requirements overnight, on 15L, going for chest xray. Will hold and follow as appropriate.     Arlin Bañuelos, PT, DPT  06/02/25

## 2025-06-02 NOTE — ED PROVIDER NOTES
Patient Seen in: [facility] Emergency Department        History  Chief Complaint   Patient presents with    Nausea/Vomiting/Diarrhea    Fever    Weakness     Stated Complaint: Fevers, diarhea, weakness    Subjective:   HPI            84-year-old male presents today for evaluation of fevers.  On Thursday, patient began having fevers.  He has had associated nausea, diarrhea, cough, abdominal pain and shortness of breath.  He denies any recent antibiotic use.  Family notes he has been more confused over the last several days.  He was weak today which prompted ER evaluation.      Objective:     Past Medical History:    Arrhythmia    Heart attack (HCC)    High blood pressure    High cholesterol    Obesity, unspecified    Other and unspecified hyperlipidemia    Prediabetes    Thyroid disease    Unspecified essential hypertension              Past Surgical History:   Procedure Laterality Date    Angioplasty (coronary)                  Social History     Socioeconomic History    Marital status:    Tobacco Use    Smoking status: Former     Current packs/day: 0.00     Average packs/day: 2.0 packs/day for 17.0 years (34.0 ttl pk-yrs)     Types: Cigarettes     Start date: 1966     Quit date: 1983     Years since quittin.9   Vaping Use    Vaping status: Never Used   Substance and Sexual Activity    Alcohol use: Yes     Types: 1 - 2 Glasses of wine per week     Comment: 1 a night    Drug use: No     Social Drivers of Health     Food Insecurity: No Food Insecurity (2025)    NCSS - Food Insecurity     Worried About Running Out of Food in the Last Year: No     Ran Out of Food in the Last Year: No   Transportation Needs: No Transportation Needs (2025)    NCSS - Transportation     Lack of Transportation: No   Housing Stability: Not At Risk (2025)    NCSS - Housing/Utilities     Has Housing: Yes     Worried About Losing Housing: No     Unable to Get Utilities: No                                 Physical Exam    ED Triage Vitals   BP 06/01/25 2010 (!) 176/61   Pulse 06/01/25 2010 70   Resp 06/01/25 2010 20   Temp 06/01/25 2018 99.4 °F (37.4 °C)   Temp src 06/01/25 2018 Temporal   SpO2 06/01/25 2010 92 %   O2 Device 06/01/25 2010 Nasal cannula       Current Vitals:   Vital Signs  BP: 117/45  Pulse: 64  Resp: 20  Temp: 99.7 °F (37.6 °C)  Temp src: Oral  MAP (mmHg): (!) 62    Oxygen Therapy  SpO2: (!) 89 % (RN Notified)  O2 Device: Simple mask (Oxymask)  O2 Flow Rate (L/min): 10 L/min  Pulse Oximetry Type: Continuous  Oximetry Probe Site Changed: No  Pulse Ox Probe Location: Left hand            Physical Exam  Vitals and nursing note reviewed.   Constitutional:       Appearance: Normal appearance.   HENT:      Head: Normocephalic.      Nose: Nose normal.      Mouth/Throat:      Mouth: Mucous membranes are moist.   Eyes:      Extraocular Movements: Extraocular movements intact.   Cardiovascular:      Rate and Rhythm: Normal rate and regular rhythm.   Pulmonary:      Effort: Pulmonary effort is normal.      Breath sounds: Normal breath sounds.   Abdominal:      General: Abdomen is flat.      Comments: Left lower quadrant tenderness without guarding or rigidity   Musculoskeletal:         General: Normal range of motion.   Skin:     General: Skin is warm.   Neurological:      General: No focal deficit present.      Mental Status: He is alert.   Psychiatric:         Mood and Affect: Mood normal.              ED Course  Labs Reviewed   CBC WITH DIFFERENTIAL WITH PLATELET - Abnormal; Notable for the following components:       Result Value    HGB 11.8 (*)     HCT 35.0 (*)     Lymphocyte Absolute 0.32 (*)     All other components within normal limits   COMP METABOLIC PANEL (14) - Abnormal; Notable for the following components:    Glucose 191 (*)     Sodium 135 (*)     Chloride 95 (*)     BUN 42 (*)     Creatinine 2.43 (*)     Calculated Osmolality 296 (*)     eGFR-Cr 26 (*)     AST 88 (*)      ALT 53 (*)     Alkaline Phosphatase 35 (*)     Albumin 4.9 (*)     All other components within normal limits   TROPONIN I HIGH SENSITIVITY - Abnormal; Notable for the following components:    Troponin I (High Sensitivity) 119 (*)     All other components within normal limits   LIPID PANEL - Abnormal; Notable for the following components:    HDL Cholesterol 19 (*)     All other components within normal limits   URINALYSIS WITH CULTURE REFLEX - Abnormal; Notable for the following components:    Clarity Urine Turbid (*)     Blood Urine 2+ (*)     Protein Urine 100 (*)     Urobilinogen Urine 2 (*)     WBC Urine 6-10 (*)     RBC Urine 3-5 (*)     All other components within normal limits   LACTIC ACID, PLASMA - Normal   TROPONIN I HIGH SENSITIVITY   TROPONIN I HIGH SENSITIVITY   HEMOGLOBIN A1C   BLOOD CULTURE   BLOOD CULTURE   C. DIFFICILE(TOXIGENIC)PCR   GI STOOL PANEL BY PCR   SPUTUM CULTURE     EKG    Rate, intervals and axes as noted on EKG Report.  Rate: 69  Rhythm: Sinus Rhythm  Reading: No STEMI              CT ABDOMEN+PELVIS(CPT=74176)  Result Date: 6/1/2025  PROCEDURE:  CT ABDOMEN+PELVIS (CPT=74176)  COMPARISON:  None.  INDICATIONS:  Fevers, diarhea, weakness  TECHNIQUE:  Unenhanced multislice CT scanning was performed from the dome of the diaphragm to the pubic symphysis.  Dose reduction techniques were used. Dose information is transmitted to the ACR (American College of Radiology) NRDR (National Radiology Data Registry) which includes the Dose Index Registry.  PATIENT STATED HISTORY: (As transcribed by Technologist)  Fevers, diarrhea, weakness for five days    FINDINGS:  LUNG BASE:  Left pleural effusion with atelectasis/infiltrate in the left lung base.. LIVER:  Mild diffuse fatty infiltration of the liver.. BILIARY:  No biliary ductal dilatation. PANCREAS:  Unremarkable. SPLEEN:  Normal caliber. KIDNEYS:  No hydronephrosis or nephrolithiasis ADRENALS:  Normal. AORTA/VASCULAR:  No aneurysm.  Aortoiliac  calcification. RETROPERITONEUM:  No enlarged adenopathy. BOWEL/MESENTERY:  Normal caliber bowel loops. Uncomplicated colonic diverticulosis ABDOMINAL WALL:  Bilateral fat containing inguinal hernias.. PELVIC ORGANS:  Mild wall thickening of the urinary bladder.  This is nonspecific.  Recommend clinical correlation to exclude cystitis.  Prostatic hypertrophy and calcification.  BONES:  Hypertrophic endplate changes thoracic and lumbar spine.  Mild degenerative changes in the lower lumbar facets.             CONCLUSION:  Left pleural effusion with atelectasis/infiltrate in the left lung base.  Pneumonia should be excluded.  Colonic diverticulosis.  Mild wall thickening of the urinary bladder.  Recommend clinical correlation to exclude cystitis.  Prostatic hypertrophy.  Bilateral fat containing inguinal hernias.  LOCATION:  [facility]   Dictated by ([facility]): Brenda Fleming MD on 6/01/2025 at 11:02 PM     Finalized by (CST): Brenda Fleming MD on 6/01/2025 at 11:05 PM       XR CHEST AP PORTABLE  (CPT=71045)  Result Date: 6/1/2025  PROCEDURE:  XR CHEST AP PORTABLE  (CPT=71045)  TECHNIQUE:  AP chest radiograph was obtained.  COMPARISON:  [facility] , XR, XR CHEST AP PORTABLE  (CPT=71045), 1/02/2023, 12:19 PM.  INDICATIONS:  Fevers, diarhea, weakness  PATIENT STATED HISTORY: (As transcribed by Technologist)              CONCLUSION:  Patchy airspace disease in the left upper lobe concerning for pneumonia.  Stable heart size and pulmonary vascularity.   LOCATION:  [facility]      Dictated by (CST): Brenda Fleming MD on 6/01/2025 at 9:21 PM     Finalized by (CST): Brenda Fleming MD on 6/01/2025 at 9:21 PM                         MDM     Differential Diagnosis  84-year-old male presents today for evaluation of several days of fever, diarrhea, weakness, shortness of breath and altered mental status.  On my assessment, patient is hemodynamic stable and in no acute distress.  He was hypoxic however O2 sat improved on nasal cannula.  He has left  lower quadrant tenderness on exam without guarding or rigidity.  Differential includes sepsis, bacteremia, pneumonia, colitis, diverticulitis.  Plan for CT, x-ray along with labs.    12:07 am  Patient's imaging concerning for pneumonia.  IV antibiotics ordered.  Additionally, his BUN and creatinine are elevated from previous levels, secondary to either decreased oral intake versus sepsis.  With patient's hypoxia and findings today, will admit to the hospital for continued care.  I notified the hospitalist of need for admission.  Patient and family updated on plan and agreeable to admission.    A total of 35 minutes of critical care time (exclusive of billable procedures) was administered to manage the patient's unstable vital signs due to his hypoxemic respiratory failure secondary to pneumonia and sepsis.  This involved direct patient intervention, complex decision making, and/or extensive discussions with the patient, family, and clinical staff.    External Chart Reviewed  BUN 30 and creatinine 1.64 in September 25 of 2024    History from [facility]  Family helps supplement history    Discussions of Management  I notified hospitalist of need for admission    Admission disposition: 6/2/2025 12:07 AM           Medical Decision Making      Disposition and Plan     Clinical Impression:  1. Community acquired pneumonia of left upper lobe of lung    2. MARYANN (acute kidney injury)    3. Acute hypoxemic respiratory failure (HCC)    4. Elevated troponin    5. Sepsis due to pneumonia (HCC)         Disposition:  Admit  6/2/2025 12:07 am    Follow-up:  No follow-up provider specified.        Medications Prescribed:  Current Discharge Medication List                Supplementary Documentation:         Hospital Problems       Present on Admission  Date Reviewed: 6/23/2015          ICD-10-CM Noted POA    * (Principal) Community acquired pneumonia of left upper lobe of lung J18.9 6/1/2025 Unknown    Acute hypoxemic  respiratory failure (HCC) J96.01 6/2/2025 Unknown    MARYANN (acute kidney injury) N17.9 6/2/2025 Unknown                             Sepsis dx documented at 6/2/2025  2:53 AM

## 2025-06-02 NOTE — IMAGING NOTE
[facility] cardiac PET 12/2024   This is a normal perfusion study, no perfusion defects noted.    The left ventricular cavity is noted to be mildly enlarged on the stress studies. The stress left ventricular ejection fraction was calculated to be 63% and left ventricular global function is normal. The rest left ventricular cavity is noted to be mildly enlarged. The rest left ventricular ejection fraction was calculated to be 61% and rest left ventricular global function is normal.                    [facility] Echo 9/2024  1. The study quality is average.   2. The left ventricle is normal in size. Mild concentric left ventricular hypertrophy is noted. Global left ventricular systolic function is normal. The left ventricular ejection fraction is 60-65%. Left ventricular diastolic function is indetreminate.  No regional wall motion abnormalities noted. Lumason was used to enhance endocardial borders.   3. The right ventricle is normal in size. Right ventricular systolic function is normal.   4. The estimated pulmonary artery systolic pressure is 39 mmHg assuming a right atrial pressure of 3 mmHg.   5. The left atrial diameter is severely increased.  6. Mild mitral annular calcification is noted. Mild (1+) mitral regurgitation.  7. Mild calcification of the aortic valve is noted with adequate cuspal excursion.

## 2025-06-02 NOTE — ED INITIAL ASSESSMENT (HPI)
Patient arrived via [facility] with complaints of being sick with diarrhea x5 days, fevers, and generalized weakness. Patient states he had difficulty getting up today so family called EMS.     Oxygen 87% on room air upon arrival. Placed on 5L NC to get O2 to 92%.

## 2025-06-02 NOTE — HISTORICAL OFFICE NOTE
[facility]  [address] [place] [address], 4th [place] [address]  [phone]      Steven Vargas  Progress Note  Demographics:  Name: Steven Vargas YOB: 1940  Age: 84, Male Medical Record No: [id number]  Visited Date/Time: 01/14/2025 09:20 AM    Chief Complaints  2 month follow up  renal artery ultrasound and PET  History of Present Illness  Patient is here for follow-up visit.  He has no complaints.  Systolic blood pressures at home are in the low 140s.  He has no chest pain or shortness of breath.  Cardiac risk factors Hypertension, Dyslipidemia, Family history of premature CAD and Former smoker  Past Medical History  1.Essential hypertension, benign  2.CAD (coronary artery disease)  3.Dyslipidemia  4.Family history of stent  Past Surgical History  1.S/P primary angioplasty with coronary stent  Family History  1. Brother - CAD native (coronary artery disease); S/P percutaneous transluminal angioplasty (PTA) with stent placement  2. Brother - CAD native (coronary artery disease); S/P percutaneous transluminal angioplasty (PTA) with stent placement  Social History  Smoking status Former smoker  Tobacco usage - No (Non-smoker for personal reasons (finding))  Review of systems  Constitutional No history of Weight Loss  Eyes No history of Blurry vision  ENT No history of Bloody nose (epistaxis)  Gastrointestinal No history of Abdominal pain  Cardiovascular No history of Chest pain, SMITH, Palpitations, Syncope, PND, Orthopnea, Edema and Claudication  Genitourinary No history of Dysuria  Musculoskeletal No history of Myalgias  Respiratory No history of SOB  Neurological No history of Migraines  Endocrine No history of Polyuria  Hem/Lymphatic No history of Easy bruising  Integumentary No history of Rashes  Physical Examination  Vitals Right Arm Sitting  / 66 mmHg, Pulse rate 57 bpm, Height in 6' 0\", BMI: 33.9, Weight in 250 lbs (or) 113.4 kgs and BSA : 2.44 cc/m²  General  Appearance No Acute Distress  Cardiovascular   EKG/Other abnormalities  HEENT: EOMI, mucosa moist  Lungs: clear  CV: RRR  Abd: soft  Ext: Trace edema  Neuro: A&O  Allergies  No medication allergies noted.  Medications  1.amLODIPine Besylate Oral Tablet 10 MG, TAKE ONE TABLET BY MOUTH ONE TIME DAILY  2.aspirin 81 mg tablet,delayed release, Take 1 tablet orally once a day.  3.atorvastatin 40 mg tablet, Take 1 tablet orally once a day.  4.carvediloL 12.5 mg tablet, Take one and a half tablet orally 2 times a day.  5.Cholecalciferol (VITAMIN D-3) 125 mcg (5,000 units) tablet, Take 1 tablet by mouth.  6.Ezetimibe Oral Tablet 10 MG, TAKE ONE TABLET BY MOUTH ONE TIME DAILY  7.fenofibrate (TRICOR) 145 MG tablet, TAKE 1 TABLET DAILY.  8.Flaxseed, Linseed, (FLAXSEED OIL) 1000 MG Cap, Take 1,000 mg by mouth.  9.hydrALAZINE 50 mg tablet, Take 2 tablets orally 3 times a day.  10.levothyroxine 88 mcg tablet, Take 1 tablet orally once a day.  11.Lisinopril-hydroCHLOROthiazide Oral Tablet 20-25 MG, TAKE ONE TABLET BY MOUTH TWICE A DAY  12.metFORMIN 1,000 mg tablet, Take one-half tablet orally once a day.  13.Misc Natural Products (GLUCOSAMINE CHOND COMPLEX/MSM) Tab, Take 1,200 mg by mouth.  14.Multiple Vitamin tablet  Impression  1.Essential hypertension, benign  2.Supraventricular tachycardia (SVT)  3.CAD (coronary artery disease)  4.S/P primary angioplasty with coronary stent  5.Dyslipidemia  6.Family history of stent  Assessment & Plan  Patient has a history coronary disease status post PCI.  He recently had a Lexiscan stress test that was normal.  LV function is normal.    I reviewed his renal artery ultrasound.  This reveals no suggestion of renal artery stenosis.    Patient's blood pressures are better on his current regimen yet, still not optimal.  Ideally had like to add Aldactone to his regimen.  However with his underlying renal insufficiency I am hesitant to do so.  Patient also is reluctant to go on another medication.   We talked about low-sodium diet and weight loss which he is going to attempt.  He will continue to be followed by [facility].    I will check back with him in about 6 to 8 months.  [facility]    Recommend low sodium diet, daily exercise and maintain a normal BMI. Recommend monitor blood pressure at home.  Medications Ordered  1.amLODIPine 10 mg tablet, Take 1 tablet orally once a day.  2.ezetimibe 10 mg tablet, Take 1 tablet orally once a day.  3.lisinopriL 20 mg-hydrochlorothiazide 25 mg tablet, Take 1 tablet orally 2 times a day.  Future appointments  1.Follow up visit - Geremias Dyer MD (6 Months)  Miscellaneous  1.Reviewed trans thoracic echocardiogram, renal vascular ultrasound, nuclear pet with the patient.  Nurses documentation  Request refills: none  Upcoming procedures: none  Assistive device use: none  Verbal order for EKG: no    Triage completed by MP/cma   Patient instructions  Follow up with Dr Dyer in 6-8 months  Lab Details  BASIC METABOLIC PANEL (8)  09/25/2024 08:19:27 AM  GLUCOSE 142 70-99 mg/dL H F  SODIUM 140 136-145 mmol/L  F  POTASSIUM 4.1 3.5-5.1 mmol/L  F  CHLORIDE 103  mmol/L  F  CO2 30.0 21.0-32.0 mmol/L  F  ANION GAP 7 0-18 mmol/L  F  BUN 30 9-23 mg/dL H F  CREATININE 1.64 0.70-1.30 mg/dL H F  CALCIUM 10.4 8.7-10.4 mg/dL  F  OSMOLALITY CALCULATED 299 275-295 mOsm/kg H F  E GFR CR 42 >=60 mL/min/1.73m2 L F  FASTING PATIENT BMP ANSWER No   F  Diagnostics Details  Nuclear PET 12/26/2024  1.Stress EKG is normal.    1.This is a normal perfusion study, no perfusion defects noted.    2.The left ventricular cavity is noted to be mildly enlarged on the stress studies. The stress left ventricular ejection fraction was calculated to be 63% and left ventricular global function is normal. The rest left ventricular cavity is noted to be mildly enlarged. The rest left ventricular ejection fraction was calculated to be 61% and rest left  ventricular global function is normal.    Renal Vascular Ultrasound 12/11/2024  1.The study quality is average.    2.No evidence of significant renal artery stenosis in the visualized bilateral renal arteries.    3.Kidney resistive indices are within normal limits bilaterally.    4.No evidence of abdominal aorta aneurysm or ectasia.    5.Renal veins are patent bilaterally.    Trans Thoracic Echocardiogram 09/17/2024  1.The study quality is average.    2.The left ventricle is normal in size. Mild concentric left ventricular hypertrophy is noted. Global left ventricular systolic function is normal. The left ventricular ejection fraction is 60-65%. Left ventricular diastolic function is indetreminate. No regional wall motion abnormalities noted. Lumason was used to enhance endocardial borders.    3.The right ventricle is normal in size. Right ventricular systolic function is normal.    4.The estimated pulmonary artery systolic pressure is 39 mmHg assuming a right atrial pressure of 3 mmHg.    5.The left atrial diameter is severely increased.    6.Mild mitral annular calcification is noted. Mild (1+) mitral regurgitation.    7.Mild calcification of the aortic valve is noted with adequate cuspal excursion.    ACTMonitoring 01/11/2023  1.This is an excellent quality study.    2.Predominant rhythm is normal sinus rhythm.    3.The minimum heart rate recorded was 40 beats / minute. The maximum heart rate is 113 beats / minute. The mean heart rate is 56 beats / minute.    4.Frequent premature atrial contractions noted.    5.Afib [place] 1%    6.Rare premature ventricular contractions noted.    7.No pauses were noted.    8.Short run of afib runs of nonsustained atrial tachycardia AIVR at night, during presumed sleeping hours    CPOE Orders carried out by: Geremias Dyer MD, Rochelle DIAS and Erica Fraser  Care Providers: Geremias Dyer MD, Erica Fraser, Gillian  Electronically Authenticated by  Geremias  Gomez KIM  01/14/2025 04:58:40 PM  Disclaimer: Components of this note were documented using voice recognition system and are subject to errors not corrected at proofreading. Contact the author of this note for any clarifications.

## 2025-06-02 NOTE — OCCUPATIONAL THERAPY NOTE
OT orders received, chart reviewed. Per RN, patient with increased O2 needs currently on 15L, awaiting chest xray, not appropriate for therapy at this time. Will hold and reattempt as able and as patient appropriate. RN aware.

## 2025-06-02 NOTE — PLAN OF CARE
Patient seen this am.  He has had worsening hypoxia since admission for pneumonia.  Repeat cxr with worsening consolidation and having mild wheeze on exam.  Will start nebs, give a dose of steroids and lasix.  Added IS and FV.  Consult to pulm service.      Rest of plan per H&P, will follow.

## 2025-06-02 NOTE — CONSULTS
[facility]  Report of Consultation    Steven Vargas Patient Status:  Inpatient    1940 MRN [id number]   [facility] 3SW-A Attending Gaetano Jaramillo MD   Hosp Day # 0 PCP Geremias Dyer MD     Reason for Consultation:  Pneumonia, hypoxia    History of Present Illness:  Steven Vargas is a a(n) 84 year old male former smoker with PMH CAD, HTN, HLD, obesity, hypothyroidism who is admitted with worsening weakness and diarrhea. He and his daughter explain over the past 5 days or so he has felt weak and had fevers/chills at home. Family has noted he seemed confused at times. He was advised to come to hospital but declined until yesterday. He was noted hypoxic on admission and chest imaging with concern for pneumonia. He has had worsening hypoxia overnight leading to pulmonary consultation. He presently feels 'fine', denies cough, phlegm, pain.  Denies previous hx of lung disease.     History:  Past Medical History[1]  Past Surgical History[2]  Family History[3]   reports that he quit smoking about 41 years ago. His smoking use included cigarettes. He started smoking about 58 years ago. He has a 34 pack-year smoking history. He does not have any smokeless tobacco history on file. He reports current alcohol use. He reports that he does not use drugs.    Allergies:  Allergies[4]    Medications:  reviewed  Scheduled Medications[5]  PRN Medications[6]    Review of Systems:   Constitutional: see HPI  Eyes: Negative for visual disturbance, irritation and redness.  Ears, nose, mouth, throat, and face: Negative for hearing loss, tinnitus, nasal congestion, snoring, sore throat, hoarseness and voice change.  Respiratory: see HPI  Cardiovascular: Negative for chest pain, palpitations, irregular heart beats, syncope, fatigue, orthopnea, paroxysmal nocturnal dyspnea, lower extremity edema.  Gastrointestinal: see HPI  Integument/breast: Negative for rash, skin lesions, and  pruritus.  Hematologic/lymphatic: Negative for easy bruising, bleeding, and lymphadenopathy.  Musculoskeletal: Negative for myalgias, arthralgias, muscle weakness.  Neurological: Negative for headaches, dizziness, seizures, memory problems, trouble swallowing, speech problems, gait problems and weakness.  : Denies dysuria, hematuria, urinary retention.  Behavioral/Psych: Normal affect, mood, speech. No AVH, No SI/HI    All other review of systems are negative.    Vital signs in last 24 hours:  Patient Vitals for the past 24 hrs:   BP Temp Temp src Pulse Resp SpO2 Weight   06/02/25 1400 -- -- -- 58 -- 97 % --   06/02/25 1354 -- 99.5 °F (37.5 °C) Oral -- -- -- --   06/02/25 1213 146/56 (!) 101.8 °F (38.8 °C) Oral 67 18 96 % --   06/02/25 0935 148/52 -- -- 72 18 95 % --   06/02/25 0851 -- -- -- -- -- 96 % --   06/02/25 0833 127/46 98.6 °F (37 °C) Axillary 71 18 97 % --   06/02/25 0725 -- -- -- -- -- (!) 89 % --   06/02/25 0405 140/63 98.5 °F (36.9 °C) Oral 56 20 91 % --   06/02/25 0105 117/45 99.7 °F (37.6 °C) Oral 64 20 (!) 89 % --   06/02/25 0104 -- -- -- -- -- -- 255 lb (115.7 kg)   06/02/25 0000 138/55 -- -- 67 26 91 % --   06/01/25 2315 119/72 -- -- 91 26 90 % --   06/01/25 2259 -- (!) 103.1 °F (39.5 °C) Oral -- -- -- --   06/01/25 2115 154/74 -- -- 73 22 95 % --   06/01/25 2018 -- 99.4 °F (37.4 °C) Temporal -- -- -- --   06/01/25 2010 (!) 176/61 -- -- 70 20 92 % 245 lb (111.1 kg)       Intake/Output:    Intake/Output Summary (Last 24 hours) at 6/2/2025 1530  Last data filed at 6/2/2025 1400  Gross per 24 hour   Intake --   Output 200 ml   Net -200 ml     FiO2 (%):  [85 %-100 %] 85 %    PHYSICAL EXAM  GEN: Appears alert, comfortable. No acute distress  PSYCH: Normal mood and affect, AAOX3  NEURO: CN 2-12 grossly intact, no focal deficits  HEENT: Atraumatic, normocephalic, EOMI, no icterus/hemorrhage, no conjunctival injection/discharge, nares normal  MOUTH: MMM, good dentition  NECK: Trachea midline, symmetric,  no visible masses or scars, no crepitus, normal flexion/extension  CHEST: Normal chest excursion, no visible deformity or scars, no tenderness to palpation  PULMONARY:clear to auscultation, no wheeze, rhonchi or crackles. No distress  COR: regular rate and rhythm, no murmur heard  GI: soft, nondistended, no rigidity, no reaction with palpation. No hernias noted  LYMPHATIC: No palpable or visible lymphadenopathy in neck, axillae, groin  MSK: equal strength and sensation in all extremities  EXT: No overt deformities, edema    Lab Data Review:  Recent Labs   Lab 06/01/25 2112   *   BUN 42*   CREATSERUM 2.43*   CA 9.4   *   K 3.6   CL 95*   CO2 26.0     Recent Labs   Lab 06/01/25 2112   RBC 3.88   HGB 11.8*   HCT 35.0*   MCV 90.2   MCH 30.4   MCHC 33.7   RDW 12.8   NEPRELIM 6.59   WBC 7.4   .0     No results for input(s): \"BNP\" in the last 168 hours.  No results for input(s): \"TROP\", \"CK\" in the last 168 hours.  No results for input(s): \"PT\", \"INR\", \"PTT\" in the last 168 hours.    Other Labs:     ABG:  No results for input(s): \"ABGPHT\", \"TMMNBN5N\", \"WWYZT5Q\", \"ABGHCO3\", \"ABGBE\", \"TEMP\", \"CARMELINA\", \"SITE\", \"DEV\", \"THGB\" in the last 168 hours.    Invalid input(s): \"TPX99PWC\", \"CHOB\"    Cultures:   No results found for this visit on 06/01/25.  Recent Labs   Lab 06/01/25  2335   COLORUR Yellow   CLARITY Turbid*   SPECGRAVITY 1.020   GLUUR Normal   BILUR Negative   KETUR Negative   BLOODURINE 2+*   PHURINE 5.5   PROUR 100*   UROBILINOGEN 2*   NITRITE Negative   LEUUR Negative   WBCUR 6-10*   RBCUR 3-5*   BACUR None Seen   EPIUR None Seen       Radiology:   Reviewed personally  XR CHEST AP PORTABLE  (CPT=71045)  Result Date: 6/2/2025  CONCLUSION:  1. No significant change when compared to 6/1/2025 chest radiograph.   LOCATION:  [facility]      Dictated by (CST): Melissa Armstrong MD on 6/02/2025 at 8:50 AM     Finalized by (CST): Melissa Armstrong MD on 6/02/2025 at 8:53 AM       CT ABDOMEN+PELVIS(CPT=74176)  Result Date:  6/1/2025  CONCLUSION:  Left pleural effusion with atelectasis/infiltrate in the left lung base.  Pneumonia should be excluded.  Colonic diverticulosis.  Mild wall thickening of the urinary bladder.  Recommend clinical correlation to exclude cystitis.  Prostatic hypertrophy.  Bilateral fat containing inguinal hernias.  LOCATION:  [facility]   Dictated by (CST): Brenda Fleming MD on 6/01/2025 at 11:02 PM     Finalized by (CST): Brenda Fleming MD on 6/01/2025 at 11:05 PM       XR CHEST AP PORTABLE  (CPT=71045)  Result Date: 6/1/2025  CONCLUSION:  Patchy airspace disease in the left upper lobe concerning for pneumonia.  Stable heart size and pulmonary vascularity.   LOCATION:  [facility]      Dictated by (CST): Brenda Fleming MD on 6/01/2025 at 9:21 PM     Finalized by (CST): Brenda Fleming MD on 6/01/2025 at 9:21 PM         ASSESSMENT  Acute hypoxic respiratory failure due to pneumonia  Sepsis due to pneumonia  LIANE pneumonia, community acquired pneumonia  CAD, elevated troponin/ NSTEMI  Elevated LFTs  Diarrhea  Acute/chronic renal failure  HTN     PLAN  Continue close monitoring of respiratory status, wean o2 for sats >89%  Continue empiric abx, await cx. Send sputum if able; blood cx pending  Send urine legionella, strep  Start scheduled nebulized bronchodilators  Start adjunctive steroids for pneumonia  Discussed with patient and daughter at bedside regarding his illness and possibility this could worsen including worsening hypoxia and respiratory failure. He does not want to consider intubation.  If worsens, would consider NIPPV. Will need to clarify with patient and wife/POA              [1]   Past Medical History:   Arrhythmia    Heart attack (HCC)    High blood pressure    High cholesterol    Obesity, unspecified    Other and unspecified hyperlipidemia    Prediabetes    Thyroid disease    Unspecified essential hypertension   [2]   Past Surgical History:  Procedure Laterality Date    Angioplasty (coronary)     [3] No  family history on file.  [4] No Known Allergies  [5]    azithromycin  500 mg Oral Daily    heparin  5,000 Units Subcutaneous 2 times per day    insulin aspart  1-68 Units Subcutaneous TID CC    insulin aspart  1-10 Units Subcutaneous TID AC and HS    guaiFENesin ER  600 mg Oral BID    cefTRIAXone  2 g Intravenous Q24H    aspirin  81 mg Oral Daily    atorvastatin  20 mg Oral Nightly    carvedilol  12.5 mg Oral BID with meals    cholecalciferol  5,000 Units Oral Daily    ezetimibe  10 mg Oral Nightly    fenofibrate micronized  134 mg Oral Daily with breakfast    ipratropium-albuterol  3 mL Nebulization Q6H WA    methylPREDNISolone sodium succinate        [START ON 6/3/2025] levothyroxine  88 mcg Oral Before breakfast   [6]   glucose **OR** glucose **OR** glucose-vitamin C **OR** dextrose **OR** glucose **OR** glucose **OR** glucose-vitamin C    acetaminophen    melatonin    polyethylene glycol (PEG 3350)    sennosides    bisacodyl    fleet enema    ondansetron    glycerin-hypromellose-    sodium chloride    albuterol    metoclopramide    methylPREDNISolone sodium succinate

## 2025-06-02 NOTE — CONSULTS
Cardiology Consultation    Steven Vargas Patient Status:  Inpatient    1940 MRN [id number]   [facility] 3SW-A Attending Yi Castillo, DO   Hosp Day # 0 PCP Geremias Dyer MD     Reason for Consultation:  NSTEMI      History of Present Illness:  Steven Vargas is a a(n) 84 year old male. Nice older trey, follows with Dr. Dyer.  Report of remote coronary stenting.  Unremarkable echo and cardiac PET late .  H/o HTN, HL, PSVT.  He presents with one weak h/o diarrhea, fever, anorexia, and nausea.  In the ER, was in ARF, Temp 103, trop's borderline.  EKG w/o ischemic changes.  He denies any chest pain.  Found to by hypoxic, increasing O2 needs.  CXR with extensive left lung infiltrate.    History:  Past Medical History[1]  Past Surgical History[2]  Family History[3]      Allergies:  Allergies[4]    Medications:  Scheduled Medications[5]    Continuous Infusions:  Medication Infusions[6]    Social History:   reports that he quit smoking about 41 years ago. His smoking use included cigarettes. He started smoking about 58 years ago. He has a 34 pack-year smoking history. He does not have any smokeless tobacco history on file. He reports current alcohol use. He reports that he does not use drugs.    Review of Systems:  All systems were reviewed and are negative except as described above in HPI.    Physical Exam:      Temp:  [98.5 °F (36.9 °C)-103.1 °F (39.5 °C)] 98.6 °F (37 °C)  Pulse:  [56-91] 72  Resp:  [18-26] 18  BP: (117-176)/(45-74) 148/52  SpO2:  [89 %-97 %] 95 %  FiO2 (%):  [100 %] 100 %    Last 3 Weights   25 0104 255 lb (115.7 kg)   25 245 lb (111.1 kg)   23 1615 256 lb 6.3 oz (116.3 kg)   23 1151 250 lb (113.4 kg)   16 1426 260 lb (117.9 kg)           General: No apparent distress  HEENT: No focal deficits.  Neck: supple. JVP normal  Cardiac: Regular rhythm, S1, S2 normal,   No rub or gallop.  No murmur.  Lungs: crackles  left  Abdomen: Soft, non-tender.   Extremities: No edema  Neurologic: no focal deficits  Skin: Warm and dry.          Telemetry: sinus    Laboratories and Data:  Diagnostics:    EKG, 6/2/2025:  neg    CXR, 6/2/2025:  reviewed    Labs:   HEM:  Recent Labs   Lab 06/01/25 2112   WBC 7.4   HGB 11.8*   .0       Chem:  Recent Labs   Lab 06/01/25 2112   *   K 3.6   CL 95*   CO2 26.0   BUN 42*   CREATSERUM 2.43*   CA 9.4   *       Recent Labs   Lab 06/01/25 2112   ALT 53*   AST 88*   ALB 4.9*       No results for input(s): \"PTP\", \"INR\" in the last 168 hours.    No results for input(s): \"TROP\", \"CK\" in the last 168 hours.      Impression:   NSTEMI - in setting of ARF.  No clear angina.  EKG non ischemic.  CAD with remote coronary PCI, no details in EPIC.  Normal stress, preserved EF late 2024.  Fever, diarrhea, nausea, anorexia - per hospitalist.  Acute hypoxic resp failure with extensive left sided pneumonia.  Acute on CRI - dehydrated/septic.  H/o HTN    Plan:  Await echo.  No further troponins needed.  Continue plan per hospitalist.  Await pulm visit, his CXR is impressive.  Holding BP meds other than carvedilol, agree.  Daily labs.  20 mg IV lasix ordered, not certain he has extra volume accounting for his worsening hypoxia.    Marcial Abraham MD  6/2/2025  9:46 AM  C5       [1]   Past Medical History:   Arrhythmia    Heart attack (HCC)    High blood pressure    High cholesterol    Obesity, unspecified    Other and unspecified hyperlipidemia    Prediabetes    Thyroid disease    Unspecified essential hypertension   [2]   Past Surgical History:  Procedure Laterality Date    Angioplasty (coronary)     [3] No family history on file.  [4] No Known Allergies  [5]    methylPREDNISolone sodium succinate        azithromycin  500 mg Oral Daily    heparin  5,000 Units Subcutaneous 2 times per day    insulin aspart  1-68 Units Subcutaneous TID CC    insulin aspart  1-10 Units Subcutaneous TID AC and HS     guaiFENesin ER  600 mg Oral BID    cefTRIAXone  2 g Intravenous Q24H    aspirin  81 mg Oral Daily    atorvastatin  20 mg Oral Nightly    carvedilol  12.5 mg Oral BID with meals    cholecalciferol  5,000 Units Oral Daily    ezetimibe  10 mg Oral Nightly    fenofibrate micronized  134 mg Oral Daily with breakfast    levothyroxine  25 mcg Oral Before breakfast    ipratropium-albuterol  3 mL Nebulization Q6H WA    methylPREDNISolone  125 mg Intravenous Once    furosemide  20 mg Intravenous Once   [6]

## 2025-06-02 NOTE — PLAN OF CARE
NURSING ADMISSION NOTE      Patient admitted via Cart  Oriented to room.  Safety precautions initiated.  Bed in low position.  Call light in reach.    Skin checked with [place] PCT, no skin issues noted, on 10L O2 mask placed by RT.

## 2025-06-02 NOTE — PLAN OF CARE
Patient A&Ox4, VSS on 40L vapotherm at 100% O2. Forgetful at times. Chest xray completed this am, pulm consulted. Sputum culture collected, still need stool sample. SCDs on, TELE. Poor appetite today, encouraged to eat. Plan to monitor supplemental oxygen needs. POC reviewed with patient and family at beside.

## 2025-06-02 NOTE — H&P
[facility]     Steven Vargas Patient Status:  Inpatient    1940 MRN [id number]   Location [facility] 3SW-A Attending Yi Castillo, DO   Hosp Day # 0 PCP Geremias Dyer MD     Chief Complaint: diarrhea, weakness    Subjective:    History of Present Illness:     Steven Vargas is a 84 year old male with PMHx HLD/ HTN/ CAD/ SVT/ hypothyroidism who presented to the hospital for weakness. He reports having progressive weakness for the past 7 days to the point he was mostly laying in  bed. He felt nauseous and had decreased PO intake. He lso noticed non-bloody diarrhea several times daily and a temp of 103 F. He denied anyu cough, dyspnea, abdominal pain.    History/Other:    Past Medical History:  Past Medical History[1]  Past Surgical History:   Past Surgical History[2]   Family History:   Family History[3]  Social History:    reports that he quit smoking about 41 years ago. His smoking use included cigarettes. He started smoking about 58 years ago. He has a 34 pack-year smoking history. He does not have any smokeless tobacco history on file. He reports current alcohol use. He reports that he does not use drugs.     Allergies: Allergies[4]    Medications:  Medications Ordered Prior to Encounter[5]    Review of Systems:   A comprehensive review of systems was completed.    Pertinent positives and negatives noted in the HPI.    Objective:   Physical Exam:    /55   Pulse 67   Temp (!) 103.1 °F (39.5 °C) (Oral)   Resp 26   Wt 245 lb (111.1 kg)   SpO2 91%   BMI 33.23 kg/m²   General: No acute distress, Alert  Respiratory: scattered wheezing and rhonchi, on 10 LPM oxymask  Cardiovascular: S1, S2. Regular rate and rhythm  Abdomen: Soft, Non-tender, non-distended, positive bowel sounds  Neuro: No new focal deficits  Extremities: No edema    Results:    Labs:      Labs Last 24 Hours:    Recent Labs   Lab 25  2112   RBC 3.88   HGB 11.8*   HCT 35.0*    MCV 90.2   MCH 30.4   MCHC 33.7   RDW 12.8   NEPRELIM 6.59   WBC 7.4   .0       Recent Labs   Lab 06/01/25 2112   *   BUN 42*   CREATSERUM 2.43*   EGFRCR 26*   CA 9.4   ALB 4.9*   *   K 3.6   CL 95*   CO2 26.0   ALKPHO 35*   AST 88*   ALT 53*   BILT 0.9   TP 7.9       Estimated Glomerular Filtration Rate: 26 mL/min/1.73m2 (A) (result from lab).    No results found for: \"PT\", \"INR\"    Recent Labs   Lab 06/01/25 2112   TROPHS 119*       No results for input(s): \"TROP\", \"PBNP\" in the last 168 hours.    No results for input(s): \"PCT\" in the last 168 hours.    Imaging: Imaging data reviewed in Epic.    Assessment & Plan:      #Sepsis 2/2 PNA with acute hypoxic respiratory failure  -chest xray showing patchy airspace ds in LIANE concerning for PNA  -CT A/P showing lft pleural effusion with atelectasis vs infiltrate, mild wall thickening of bladder, bl fat containing inguinal hernias  -T 103.1 F, HR 91, RR 26  -SpO2 as low as 87% on room air  -antibiotics: azithro, rocephin  -obtain sputum cx if able  -blood cx pending  -nebs, mucinex, tylenol  -wean o2 as able with goal SpO2 >94%    #MARYANN on CKD 3b  -Cr 2.43 with BUN 42: baseline 1.64 with GFR 42  -Bun: Cr ratio <20 suggestive of intra-renal vs post-renal  -check PVR  -monitor BMP  -strict I/O, avoid nephrotoxins    #elevated troponin  -troponin 119  -EKG showing NSR @69 bpm, PAC's  -suspect ype II spill from sepsis and renal ds  -cont to trend troponin until peak  -monitor on telemetry    #elevated aminotransferases  -Alk phos 296, AST 35, ALT 88  -likely 2/2 infection as above  -cont to monitor LFTs  -given diarrhea send Gi panel, C Diff    #DM  -glucose 191  -SSI, QID checks, hypoglycemia protocol  -hold home metformin    #normocytic anemia  -hgb 11.8: last 12,7  -no signs active bleeding  -cont to monitor CBC    #pseudohyponatremia  -Na corrects to normal for hyperglycemia    #HTN  -hold home amlodipine/ hydralazine/ lisinopril-HCTZ due to  borderline low BP    #CAD  -cont home ASA, statin, carvedilol    #HLD  -cont home ezetimibe, fenofibrate    #hypothyroidism  -cont home levothyroxine      Plan of care discussed with ED physician    Yi Castillo DO    Supplementary Documentation:     The 21st Century Cures Act makes medical notes like these available to patients in the interest of transparency. Please be advised this is a medical document. Medical documents are intended to carry relevant information, facts as evident, and the clinical opinion of the practitioner. The medical note is intended as peer to peer communication and may appear blunt or direct. It is written in medical language and may contain abbreviations or verbiage that are unfamiliar.               **Certification      PHYSICIAN Certification of Need for Inpatient Hospitalization - Initial Certification    Patient will require inpatient services that will reasonably be expected to span two midnight's based on the clinical documentation in H+P.   Based on patients current state of illness, I anticipate that, after discharge, patient will require TBD.                        [1]   Past Medical History:   Heart attack (HCC)    High blood pressure    High cholesterol    Obesity, unspecified    Other and unspecified hyperlipidemia    Prediabetes    Thyroid disease    Unspecified essential hypertension   [2]   Past Surgical History:  Procedure Laterality Date    Angioplasty (coronary)     [3] No family history on file.  [4] No Known Allergies  [5]   No current facility-administered medications on file prior to encounter.     Current Outpatient Medications on File Prior to Encounter   Medication Sig Dispense Refill    ezetimibe 10 MG Oral Tab Take 1 tablet (10 mg total) by mouth nightly.      metFORMIN HCl ER, OSM, 500 MG (OSM) Oral Tablet 24 Hr Take 1 tablet (500 mg total) by mouth daily with breakfast.      carvedilol 12.5 MG Oral Tab Take 1 tablet (12.5 mg total) by mouth 2 (two) times  daily with meals.      aspirin 81 MG Oral Chew Tab Chew 1 tablet (81 mg total) by mouth in the morning.  0    hydrALAZINE 50 MG Oral Tab Take 2 tablets (100 mg total) by mouth in the morning and 2 tablets (100 mg total) before bedtime.      lisinopril-hydroCHLOROthiazide 20-12.5 MG Oral Tab Take 1 tablet by mouth in the morning and 1 tablet before bedtime.      Fenofibrate (TRICOR) 145 MG Oral Tab Take 1 tablet (145 mg total) by mouth in the morning.      AmLODIPine Besylate (NORVASC) 10 MG Oral Tab Take 1 tablet (10 mg total) by mouth in the morning.      Levothyroxine Sodium (SYNTHROID, LEVOTHROID) 25 MCG Oral Tab Take 1 tablet (25 mcg total) by mouth before breakfast.      Atorvastatin Calcium (LIPITOR) 20 MG Oral Tab Take 1 tablet (20 mg total) by mouth nightly.      Omega-3 Fatty Acids (FISH OIL) 1200 MG Oral Cap Take 1,200 mg by mouth daily.      Flaxseed, Linseed, (FLAXSEED OIL) 1000 MG Oral Cap Take 1,000 mg by mouth daily.      Misc Natural Products (GLUCOSAMINE CHOND COMPLEX/MSM) Oral Tab Take 1,200 mg by mouth daily.      Cholecalciferol (VITAMIN D-3) 5000 UNITS Oral Tab Take 1 tablet by mouth daily.      Multiple Vitamins-Minerals (MULTI-VITAMIN/MINERALS) Oral Tab Take 1 tablet by mouth daily.

## 2025-06-03 PROBLEM — A48.1 LEGIONNAIRE'S DISEASE (HCC): Status: ACTIVE | Noted: 2025-06-03

## 2025-06-03 LAB
ADENOVIRUS F 40/41 PCR: NEGATIVE
ALBUMIN SERPL-MCNC: 4.1 G/DL (ref 3.2–4.8)
ALBUMIN/GLOB SERPL: 1.5 {RATIO} (ref 1–2)
ALP LIVER SERPL-CCNC: 34 U/L (ref 45–117)
ALT SERPL-CCNC: 66 U/L (ref 10–49)
ANION GAP SERPL CALC-SCNC: 12 MMOL/L (ref 0–18)
ANION GAP SERPL CALC-SCNC: 12 MMOL/L (ref 0–18)
AST SERPL-CCNC: 87 U/L (ref ?–34)
ASTROVIRUS PCR: NEGATIVE
BASOPHILS # BLD AUTO: 0 X10(3) UL (ref 0–0.2)
BASOPHILS NFR BLD AUTO: 0 %
BILIRUB SERPL-MCNC: 0.5 MG/DL (ref 0.2–1.1)
BUN BLD-MCNC: 55 MG/DL (ref 9–23)
BUN BLD-MCNC: 55 MG/DL (ref 9–23)
C CAYETANENSIS DNA SPEC QL NAA+PROBE: NEGATIVE
C DIFF GDH + TOXINS A+B STL QL IA.RAPID: NOT DETECTED
C DIFF TOX B STL QL: POSITIVE
CALCIUM BLD-MCNC: 8.8 MG/DL (ref 8.7–10.6)
CALCIUM BLD-MCNC: 8.8 MG/DL (ref 8.7–10.6)
CAMPY SP DNA.DIARRHEA STL QL NAA+PROBE: NEGATIVE
CHLORIDE SERPL-SCNC: 99 MMOL/L (ref 98–112)
CHLORIDE SERPL-SCNC: 99 MMOL/L (ref 98–112)
CO2 SERPL-SCNC: 24 MMOL/L (ref 21–32)
CO2 SERPL-SCNC: 24 MMOL/L (ref 21–32)
CREAT BLD-MCNC: 2.15 MG/DL (ref 0.7–1.3)
CREAT BLD-MCNC: 2.15 MG/DL (ref 0.7–1.3)
CRYPTOSP DNA SPEC QL NAA+PROBE: NEGATIVE
EAEC PAA PLAS AGGR+AATA ST NAA+NON-PRB: NEGATIVE
EC STX1+STX2 + H7 FLIC SPEC NAA+PROBE: NEGATIVE
EGFRCR SERPLBLD CKD-EPI 2021: 30 ML/MIN/1.73M2 (ref 60–?)
EGFRCR SERPLBLD CKD-EPI 2021: 30 ML/MIN/1.73M2 (ref 60–?)
ENTAMOEBA HISTOLYTICA PCR: NEGATIVE
EOSINOPHIL # BLD AUTO: 0 X10(3) UL (ref 0–0.7)
EOSINOPHIL NFR BLD AUTO: 0 %
EPEC EAE GENE STL QL NAA+NON-PROBE: NEGATIVE
ERYTHROCYTE [DISTWIDTH] IN BLOOD BY AUTOMATED COUNT: 12.9 %
ETEC LTA+ST1A+ST1B TOX ST NAA+NON-PROBE: NEGATIVE
GIARDIA LAMBLIA PCR: NEGATIVE
GLOBULIN PLAS-MCNC: 2.8 G/DL (ref 2–3.5)
GLUCOSE BLD-MCNC: 226 MG/DL (ref 70–99)
GLUCOSE BLD-MCNC: 226 MG/DL (ref 70–99)
GLUCOSE BLD-MCNC: 227 MG/DL (ref 70–99)
GLUCOSE BLD-MCNC: 272 MG/DL (ref 70–99)
GLUCOSE BLD-MCNC: 280 MG/DL (ref 70–99)
GLUCOSE BLD-MCNC: 340 MG/DL (ref 70–99)
HCT VFR BLD AUTO: 34.9 % (ref 39–53)
HGB BLD-MCNC: 11.4 G/DL (ref 13–17.5)
IMM GRANULOCYTES # BLD AUTO: 0.03 X10(3) UL (ref 0–1)
IMM GRANULOCYTES NFR BLD: 0.5 %
LYMPHOCYTES # BLD AUTO: 0.24 X10(3) UL (ref 1–4)
LYMPHOCYTES NFR BLD AUTO: 4.1 %
MCH RBC QN AUTO: 29.7 PG (ref 26–34)
MCHC RBC AUTO-ENTMCNC: 32.7 G/DL (ref 31–37)
MCV RBC AUTO: 90.9 FL (ref 80–100)
MONOCYTES # BLD AUTO: 0.18 X10(3) UL (ref 0.1–1)
MONOCYTES NFR BLD AUTO: 3 %
NEUTROPHILS # BLD AUTO: 5.47 X10 (3) UL (ref 1.5–7.7)
NEUTROPHILS # BLD AUTO: 5.47 X10(3) UL (ref 1.5–7.7)
NEUTROPHILS NFR BLD AUTO: 92.4 %
NOROVIRUS GI/GII PCR: NEGATIVE
OSMOLALITY SERPL CALC.SUM OF ELEC: 302 MOSM/KG (ref 275–295)
OSMOLALITY SERPL CALC.SUM OF ELEC: 302 MOSM/KG (ref 275–295)
P SHIGELLOIDES DNA STL QL NAA+PROBE: NEGATIVE
PLATELET # BLD AUTO: 242 10(3)UL (ref 150–450)
POTASSIUM SERPL-SCNC: 4 MMOL/L (ref 3.5–5.1)
POTASSIUM SERPL-SCNC: 4 MMOL/L (ref 3.5–5.1)
PROT SERPL-MCNC: 6.9 G/DL (ref 5.7–8.2)
RBC # BLD AUTO: 3.84 X10(6)UL (ref 3.8–5.8)
ROTAVIRUS A PCR: NEGATIVE
SALMONELLA DNA SPEC QL NAA+PROBE: NEGATIVE
SAPOVIRUS PCR: NEGATIVE
SHIGELLA SP+EIEC IPAH ST NAA+NON-PROBE: NEGATIVE
SODIUM SERPL-SCNC: 135 MMOL/L (ref 136–145)
SODIUM SERPL-SCNC: 135 MMOL/L (ref 136–145)
V CHOLERAE DNA SPEC QL NAA+PROBE: NEGATIVE
VIBRIO DNA SPEC NAA+PROBE: NEGATIVE
WBC # BLD AUTO: 5.9 X10(3) UL (ref 4–11)
YERSINIA DNA SPEC NAA+PROBE: NEGATIVE

## 2025-06-03 PROCEDURE — 99233 SBSQ HOSP IP/OBS HIGH 50: CPT | Performed by: INTERNAL MEDICINE

## 2025-06-03 PROCEDURE — 99233 SBSQ HOSP IP/OBS HIGH 50: CPT | Performed by: HOSPITALIST

## 2025-06-03 RX ORDER — METHYLPREDNISOLONE SODIUM SUCCINATE 40 MG/ML
40 INJECTION INTRAMUSCULAR; INTRAVENOUS EVERY 12 HOURS
Status: DISCONTINUED | OUTPATIENT
Start: 2025-06-04 | End: 2025-06-08

## 2025-06-03 RX ORDER — INSULIN DEGLUDEC 100 U/ML
10 INJECTION, SOLUTION SUBCUTANEOUS NIGHTLY
Status: DISCONTINUED | OUTPATIENT
Start: 2025-06-03 | End: 2025-06-04

## 2025-06-03 RX ORDER — VANCOMYCIN HYDROCHLORIDE 125 MG/1
125 CAPSULE ORAL DAILY
Status: DISCONTINUED | OUTPATIENT
Start: 2025-06-03 | End: 2025-06-09

## 2025-06-03 NOTE — OCCUPATIONAL THERAPY NOTE
OCCUPATIONAL THERAPY EVALUATION - INPATIENT     Room Number: 369/369-A  Evaluation Date: 6/3/2025  Type of Evaluation: Initial  Presenting Problem: Community acquired pnuemonia of L upper lobe of lung    Physician Order: IP Consult to Occupational Therapy  Reason for Therapy: ADL/IADL Dysfunction and Discharge Planning    OCCUPATIONAL THERAPY ASSESSMENT   Patient is currently functioning below baseline with toileting, upper body dressing, lower body dressing, bed mobility, transfers, static sitting balance, dynamic sitting balance, static standing balance, dynamic standing balance, maintaining seated position, functional standing tolerance, energy conservation strategies, and aerobic capacity. Prior to admission, patient's baseline is IND with ADLs/IADLs.  Patient is requiring contact guard assist as a result of the following impairments: decreased functional strength, decreased functional reach, decreased endurance, and increased O2 needs from baseline. Occupational Therapy will continue to follow for duration of hospitalization.    Patient will benefit from continued skilled OT Services for duration of hospitalization, however, given the patient is functioning near baseline level do not anticipate skilled therapy needs at discharge     History Related to Current Admission: Patient is a 84 year old male admitted on 6/1/2025 with Presenting Problem: Community acquired pnuemonia of L upper lobe of lung. Co-Morbidities : HLD/ HTN/ CAD/ SVT/ hypothyroidism    WEIGHT BEARING RESTRICTION       Recommendations for nursing staff:   Transfers: up x 1 assist, SBA-CGA, RW  Toileting location: Bedlevel,  may transition to bedside commode with nursing staff ( limited to distance d/t vapotherm)    EVALUATION SESSION:  Patient Start of Session: semi supine in bed    FUNCTIONAL TRANSFER ASSESSMENT  Sit to Stand: Edge of Bed  Edge of Bed: Contact Guard Assist    BED MOBILITY  Supine to Sit : Stand-by Assist  Sit to Supine (OT): Not  Tested  Scooting: Supervision (utilized bed rails)    BALANCE ASSESSMENT  Static Sitting: Supervision  Static Standing: Contact Guard Assist    FUNCTIONAL ADL ASSESSMENT  LB Dressing Seated: Supervision    ACTIVITY TOLERANCE: WFL; pt tolerated transfer from bed > chair in preparation for toilet transfers, limited to further mobility as pt on vapotherm; vitals remained stable throughout session.                         O2 SATURATIONS  Oxygen Therapy  SPO2% on Oxygen at Rest: 95  At rest oxygen flow (liters per minute): 40    COGNITION  Overall Cognitive Status:  WFL - within functional limits    Upper Extremity   ROM: within functional limits  Strength: within functional limits    EDUCATION PROVIDED  Patient Education : Role of Occupational Therapy; Plan of Care; Functional Transfer Techniques; Fall Prevention; Posture/Positioning; Energy Conservation; Proper Body Mechanics  Patient's Response to Education: Verbalized Understanding; Returned Demonstration; Requires Further Education    Equipment used: RW, hospital bed functions  Would benefit from additional trial yes     Therapist comments: RN cleared pt for session, received semi supine in bed. Pt educated on energy conservation techniques this session; pt tolerated distance from bed > chair, limited to further mobility this date as pt on vapotherm. Pt safely seated in chair at end of session, educated on benefits of sitting in chair to improve lung function and blood flow.     Patient End of Session: Up in chair, Needs met, Call light within reach, RN aware of session/findings, All patient questions and concerns addressed, Hospital anti-slip socks, Alarm set    OCCUPATIONAL PROFILE    HOME SITUATION  Type of Home: House  Home Layout: Two level  Lives With: Spouse    Toilet and Equipment: Comfort height toilet  Shower/Tub and Equipment: Walk-in shower, Grab bar  Other Equipment: Long-handled shoehorn (RW and cane)          Drives: Yes  Patient Regularly Uses:  Glasses    Prior Level of Function: IND with ADLs/IADLs, lives at home with supportive spouse.    SUBJECTIVE   \"Oh good!\"    PAIN ASSESSMENT  Rating: Unable to rate  Location: lower back from laying in bed  Management Techniques: Repositioning, Body mechanics, Activity promotion    OBJECTIVE  Precautions: Bed/chair alarm (Vapotherm 40L)  Fall Risk: High fall risk    ASSESSMENTS    AM-PAC ‘6-Clicks’ Inpatient Daily Activity Short Form  -   Putting on and taking off regular lower body clothing?: A Little  -   Bathing (including washing, rinsing, drying)?: A Little  -   Toileting, which includes using toilet, bedpan or urinal? : A Little  -   Putting on and taking off regular upper body clothing?: None  -   Taking care of personal grooming such as brushing teeth?: None  -   Eating meals?: None    AM-PAC Score:  Score: 21  Approx Degree of Impairment: 32.79%  Standardized Score (AM-PAC Scale): 44.27    ADDITIONAL TESTS     NEUROLOGICAL FINDINGS      COGNITION ASSESSMENTS     PLAN  OT Device Recommendations: TBD  OT Treatment Plan: Balance activities, Energy conservation/work simplification techniques, ADL training, IADL training, Functional transfer training, UE strengthening/ROM, Endurance training, Patient/Family education, Patient/Family training, Equipment eval/education, Compensatory technique education, Continued evaluation  Rehab Potential : Good  Frequency: 3-5x/week  Number of Visits to Meet Established Goals: 3    ADL Goals   Patient will perform lower body dressing:  with modified independent  Patient will perform toileting: with modified independent    Functional Transfer Goals  Patient will transfer from supine to sit:  with supervision  Patient will transfer from sit to stand:  with supervision  Patient will transfer to toilet:  with supervision    UE Exercise Program Goal  Patient will be modified independent with bilateral AROM HEP (home exercise program).    Additional Goals  Pt will state 2 energy  conservation techniques and utilize during ADL.    Patient Evaluation Complexity Level:   Occupational Profile/Medical History LOW - Brief history including review of medical or therapy records    Specific performance deficits impacting engagement in ADL/IADL LOW  1 - 3 performance deficits    Client Assessment/Performance Deficits LOW - No comorbidities nor modifications of tasks    Clinical Decision Making LOW - Analysis of occupational profile, problem-focused assessments, limited treatment options    Overall Complexity LOW     OT Session Time: 20 minutes  Self-Care Home Management: 12 minutes

## 2025-06-03 NOTE — PROGRESS NOTES
[facility] Pulmonary Progress Note    Steven Vargas Patient Status:  Inpatient    1940 MRN [id number]   Location [facility] 3SW-A Attending Gaetano Jaramillo MD   Hosp Day # 1 PCP Geremias Dyer MD     Subjective:    Overnight: No acute events overnight. Afebrile. On 40L 70% vapo. States he has a dry cough. Denies SOB. Been working on his IS/flutter valve.     Objective:  /71 (BP Location: Left arm)   Pulse 67   Temp 98.1 °F (36.7 °C) (Oral)   Resp 18   Wt 255 lb (115.7 kg)   SpO2 95%   BMI 34.58 kg/m²     Temp (24hrs), Av.8 °F (37.1 °C), Min:97.7 °F (36.5 °C), Max:101.8 °F (38.8 °C)      Intake/Output:    Intake/Output Summary (Last 24 hours) at 6/3/2025 0918  Last data filed at 6/3/2025 0819  Gross per 24 hour   Intake --   Output 1200 ml   Net -1200 ml       Physical Exam:   General: alert, cooperative, oriented.  No respiratory distress.   Head: Normocephalic, without obvious abnormality, atraumatic.   Throat: Lips, mucosa, and tongue normal.  No thrush noted.   Neck: trachea midline, no adenopathy, no thyromegaly. No JVD.   Lungs: clear to auscultation bilaterally, diminished breath sounds bilaterally   Chest wall: No tenderness or deformity.   Heart: regular rate and rhythm   Abdomen: soft, non-distended, no masses, no guarding, no     Rebound.   Extremity: No edema or cyanosis   Skin: No rashes or lesions.   Neurological: Alert, interactive, no focal deficits    Lab Data Review:  Recent Labs     25  042   WBC 7.4 5.9   HGB 11.8* 11.4*   .0 242.0     Recent Labs     25  0424   * 135*  135*   K 3.6 4.0  4.0   CL 95* 99  99   CO2 26.0 24.0  24.0   BUN 42* 55*  55*   CREATSERUM 2.43* 2.15*  2.15*     No results for input(s): \"PTP\", \"INR\", \"PTT\" in the last 168 hours.    Cultures:   Hospital Encounter on 25   1. Blood Culture     Status: None (Preliminary result)    Collection Time: 25  9:47 PM    Specimen:  Blood,peripheral   Result Value Ref Range    Blood Culture Result No Growth 1 Day N/A       Radiology:  XR CHEST AP PORTABLE  (CPT=71045)  Narrative: PROCEDURE:  XR CHEST AP PORTABLE  (CPT=71045)     TECHNIQUE:  AP chest radiograph was obtained.     COMPARISON:  [facility] , XR, XR CHEST AP PORTABLE  (CPT=71045), 6/01/2025, 9:09 PM.     INDICATIONS:  hypoxia     PATIENT STATED HISTORY: (As transcribed by Technologist)  Patient is having difficulty in breathing         FINDINGS:  Persistent increased airspace opacity involves left perihilar upper lobe.  No new focal consolidation, pleural effusion, or pneumothorax.  Cardiomediastinal silhouette is stable.                   Impression: CONCLUSION:    1. No significant change when compared to 6/1/2025 chest radiograph.        LOCATION:  [facility]                 Dictated by (CST): Melissa Armstrong MD on 6/02/2025 at 8:50 AM       Finalized by (CST): Melissa Armstrong MD on 6/02/2025 at 8:53 AM         Medications reviewed     ASSESSMENT  Acute hypoxic respiratory failure due to pneumonia  Sepsis due to pneumonia  LIANE pneumonia, community acquired pneumonia  CAD, elevated troponin/ NSTEMI  Elevated LFTs  Diarrhea  Acute/chronic renal failure  HTN   Negative urine strep, POSITIVE urine legionella      PLAN  Continue close monitoring of respiratory status, wean o2 for sats >89%  Continue azithromycin and rocephin for pneumonia    Sputum pending; blood cx pending  Continue scheduled nebulized bronchodilators  Continue adjunctive steroids for pneumonia  If respiratory status worsens, would consider NIPPV. Will need to clarify with patient and wife/POA    Discussed with patient, Lacie Irene and Dr Lopes     Is this a shared or split note between Advanced Practice Provider and Physician? Yes   ARUN York  [facility] Pulmonary Medicine  Office: [phone]

## 2025-06-03 NOTE — PROGRESS NOTES
[facility]   part of [facility]     Hospitalist Progress Note     Steven Vargas Patient Status:  Inpatient    1940 MRN [id number]   Location [facility] 3SW-A Attending Gaetano Jaramillo MD   Hosp Day # 1 PCP Geremias Dyer MD     Chief Complaint: weakness    Subjective:     Patient feels a little better today.  He still feels sob with exertion, cough better.  No cp.  No new complaints.     Objective:    Review of Systems:   A comprehensive review of systems was completed; pertinent positive and negatives stated in subjective.    Vital signs:  Temp:  [97.7 °F (36.5 °C)-98.6 °F (37 °C)] 97.7 °F (36.5 °C)  Pulse:  [49-70] 55  Resp:  [16-18] 16  BP: (115-131)/(46-71) 115/56  SpO2:  [90 %-98 %] 93 %  FiO2 (%):  [50 %-75 %] 50 %    Physical Exam:    General: No acute distress  Respiratory: Rhonchi to left lung, no wheeze  Cardiovascular: S1, S2, regular rate and rhythm  Abdomen: Soft, Non-tender, non-distended, positive bowel sounds  Neuro: No new focal deficits.   Extremities: No edema    Diagnostic Data:    Labs:  Recent Labs   Lab 25  0424   WBC 7.4 5.9   HGB 11.8* 11.4*   MCV 90.2 90.9   .0 242.0       Recent Labs   Lab 25  0424   * 226*  226*   BUN 42* 55*  55*   CREATSERUM 2.43* 2.15*  2.15*   CA 9.4 8.8  8.8   ALB 4.9* 4.1   * 135*  135*   K 3.6 4.0  4.0   CL 95* 99  99   CO2 26.0 24.0  24.0   ALKPHO 35* 34*   AST 88* 87*   ALT 53* 66*   BILT 0.9 0.5   TP 7.9 6.9       Estimated Glomerular Filtration Rate: 30 mL/min/1.73m2 (A) (result from lab).    Recent Labs   Lab 25  0553   TROPHS 119* 97*       No results for input(s): \"PTP\", \"INR\" in the last 168 hours.               Microbiology    Hospital Encounter on 25   1. Blood Culture     Status: None (Preliminary result)    Collection Time: 25  9:47 PM    Specimen: Blood,peripheral   Result Value Ref Range    Blood Culture Result No  Growth 1 Day N/A         Imaging: Reviewed in Epic.    Medications: Scheduled Medications[1]    Assessment & Plan:      #Acute hypoxic respiratory failure  #Pneumonia - LIANE  #Sepsis due to pneumonia  Patient on Vapotherm now - wean as tolerated  F/u sputum cultures  Nebs, steroids  Zosyn  Pulm following    #CAD  ASA, statin, Coreg    #NSTEMI Type 2  Trop 119 -> 97, likely 2/2 sepsis   Echo  Cards consulted from ER    #Renal insufficiency with CKD 3A  Cr up likely 2/2 sepsis, b/l 1.6  2.4 -> 2.15 - monitor bmp    #Diarrhea  Improving   On vanc prophylaxis for c diff  Toxin neg    #DM Type 2  #Hyperglycemia 2/2 steroid  Add NPH 10U BID for steroid use  Degludec 10U nightly  Carb coverage, ISS    #Hypothyroidism  Synthroid     #HLD  Statin, fibrate           Gaetano Jaramillo MD    Supplementary Documentation:     Quality:  DVT Mechanical Prophylaxis:   SCDs,    DVT Pharmacologic Prophylaxis   Medication    heparin (Porcine) 5000 UNIT/ML injection 5,000 Units      DVT Pharmacologic prophylaxis: Aspirin 162 mg         Code Status: Not on file  Disla: No urinary catheter in place  Disla Duration (in days):   Central line:    EVELIA:     Discharge is dependent on: oxygen status   At this point Mr. Vargas is expected to be discharge to: Home    The 21st Century Cures Act makes medical notes like these available to patients in the interest of transparency. Please be advised this is a medical document. Medical documents are intended to carry relevant information, facts as evident, and the clinical opinion of the practitioner. The medical note is intended as peer to peer communication and may appear blunt or direct. It is written in medical language and may contain abbreviations or verbiage that are unfamiliar.                         [1]    insulin degludec  10 Units Subcutaneous Nightly    insulin NPH-human isophane  10 Units Subcutaneous Q12H    azithromycin  500 mg Intravenous Q24H    heparin  5,000 Units Subcutaneous 2 times  per day    insulin aspart  1-68 Units Subcutaneous TID CC    guaiFENesin ER  600 mg Oral BID    cefTRIAXone  2 g Intravenous Q24H    aspirin  81 mg Oral Daily    atorvastatin  20 mg Oral Nightly    carvedilol  12.5 mg Oral BID with meals    cholecalciferol  5,000 Units Oral Daily    ezetimibe  10 mg Oral Nightly    fenofibrate micronized  134 mg Oral Daily with breakfast    ipratropium-albuterol  3 mL Nebulization Q6H WA    levothyroxine  88 mcg Oral Before breakfast    methylPREDNISolone  40 mg Intravenous Q8H

## 2025-06-03 NOTE — PLAN OF CARE
PATIENT: SHYANNE PICHARDO   -  : 1985   -  DOS:2024   -  INTERPRETING PROVIDER:Thee Bedoya,   Indication  ========    AMA, BMI 44, previous C/S for FTP, hx of PIH.    Method  ======    Transabdominal ultrasound examination. View: Sufficient    Pregnancy  =========    Peterson pregnancy. Number of fetuses: 1    Dating  ======    LMP on: 2023  GA by LMP 13 w + 4 d  KRISSY by LMP: 2024  Previous Ultrasound on: 2024  Type of prior assessment: GA  GA at prior assessment date 8 w + 5 d  GA by previous U/S 13 w + 3 d  KRISSY by previous Ultrasound: 2024  Ultrasound examination on: 2024  GA by U/S based upon: CRL  GA by U/S 13 w + 1 d  KRISSY by U/S: 2024  Assigned: based on the LMP, selected on 2024  Assigned GA 13 w + 4 d  Assigned KRISSY: 2024    General Evaluation  ==============    Cardiac activity present  Placenta: Anterior  Cord vessels: 3 vessel cord  Amniotic fluid: normal amount    Fetal Biometry  ============    Standard   bpm  64% Nicolaides  CRL 70.0 mm 13w 1d 26% Hadlock  NT 1.90 mm  Extended  Nasal bone: present    Fetal Anatomy  ===========    The following structures appear normal:  Stomach. Bladder.    The following structures were visualized:  Cranium: Midline falx  Choroid plexus. Abdominal wall: Intact. Arms. Legs.    Maternal Structures  ===============    Ovaries / Tubes / Adnexa  Rt ovary: Not visualized  Lt ovary: Not visualized    Findings  =======    Living Peterson pregnancy at 13w 4d by clinical dates.  NT measures 1.9 mm.  Anatomy visualized as stated above.  Placental site is Anterior.    Single living intrauterine pregnancy at 13 weeks and 4 days. Adequate fetal growth.  Normal nuchal translucency measurements obtained in magnified sagittal views with amniotic membrane seen separately. The patient has already decided to continue with  NIPT screening for trisomy 21, 18 and 13. You have ordered that screening study.  We performed a  Patient A&OX4, still on vapotherm but weaning off. Patient looks and feels better today compared to yesterday. Was able to get to the chair with staff, tolerated well. Denies pain or discomfort. Continued isolation. IV ABX and PO ABX. IV steroids. Plan to wean O2 as able. Cultures pending.

## 2025-06-03 NOTE — PHYSICAL THERAPY NOTE
PHYSICAL THERAPY EVALUATION - INPATIENT     Room Number: 369/369-A  Evaluation Date: 6/3/2025  Type of Evaluation: Initial  Physician Order: PT Eval and Treat    Presenting Problem: Community Acquired pneumonia of L upper lobe of lung  Co-Morbidities : HLD/ HTN/ CAD/ SVT/ hypothyroidism  Reason for Therapy: Mobility Dysfunction and Discharge Planning    PHYSICAL THERAPY ASSESSMENT   Patient is a 84 year old male admitted 6/1/2025 for Community Acquired pneumonia of L upper lobe of lung.  Prior to admission, patient's baseline is Mod I.  Patient is currently functioning near baseline with bed mobility, transfers, and gait.  Patient is requiring contact guard assist as a result of the following impairments: decreased endurance/aerobic capacity, pain, impaired Gait and standing balance, and decreased muscular endurance.  Physical Therapy will continue to follow for duration of hospitalization.    Patient will benefit from continued skilled PT Services at discharge to promote prior level of function.  Anticipate patient will return home with home health PT.    PLAN DURING HOSPITALIZATION  Nursing Mobility Recommendation : 1 Assist  PT Device Recommendation: Rolling walker  PT Treatment Plan: Bed mobility, Body mechanics, Gait training, Strengthening, Stair training, Transfer training, Balance training  Rehab Potential : Good  Frequency (Obs): 3-5x/week     CURRENT GOALS    Goal #1 Patient is able to demonstrate supine - sit EOB @ level: supervision     Goal #2 Patient is able to demonstrate transfers EOB to/from BSC at assistance level: supervision     Goal #3 Patient is able to ambulate 150 feet with assist device: walker - rolling at assistance level: supervision     Goal #4 Pt is able to perform marching in place for 1 mins with CGA with the RW.    Goal #5 Pt is able to perform chair    Goal #6    Goal Comments: Goals established on 6/3/2025      PHYSICAL THERAPY MEDICAL/SOCIAL HISTORY  History related to current  admission: Patient is a 84 year old male admitted on 2025 from Home for Community Acquired pneumonia of L upper lobe of lung.      HOME SITUATION  Type of Home: House  Home Layout: Two level           Stairs to Bedroom: 12    Railing: Yes    Lives With: Spouse    Drives: Yes   Patient Regularly Uses: Glasses      Prior Level of [place]: Pt states that he lives in a house with a spouse. Pt reports that he is IND with all his ADLs. Pt does not typically use a RW to ambulate.     SUBJECTIVE  \"I feel better compared to yesterday\"    OBJECTIVE  Precautions: Bed/chair alarm (Vapotherm 40L)  Fall Risk: High fall risk    WEIGHT BEARING RESTRICTION     PAIN ASSESSMENT  Ratin  Location: Pt reports no pain  Management Techniques: Activity promotion    COGNITION  Overall Cognitive Status:  WFL - within functional limits    RANGE OF MOTION AND STRENGTH ASSESSMENT  Upper extremity ROM and strength are within functional limits     Lower extremity ROM is within functional limits     Lower extremity strength is within functional limits     BALANCE  Static Sitting: Fair +  Dynamic Sitting: Fair +  Static Standing: Fair  Dynamic Standing: Fair -    ADDITIONAL TESTS                                    ACTIVITY TOLERANCE                         O2 WALK  Oxygen Therapy  SPO2% on Oxygen at Rest: 95  At rest oxygen flow (liters per minute): 40    NEUROLOGICAL FINDINGS                        AM-PAC '6-Clicks' INPATIENT SHORT FORM - BASIC MOBILITY  How much difficulty does the patient currently have...  Patient Difficulty: Turning over in bed (including adjusting bedclothes, sheets and blankets)?: A Little   Patient Difficulty: Sitting down on and standing up from a chair with arms (e.g., wheelchair, bedside commode, etc.): A Little   Patient Difficulty: Moving from lying on back to sitting on the side of the bed?: A Little   How much help from another person does the patient currently need...   Help from Another: Moving to and  from a bed to a chair (including a wheelchair)?: A Little   Help from Another: Need to walk in hospital room?: A Little   Help from Another: Climbing 3-5 steps with a railing?: A Little     AM-PAC Score:  Raw Score: 18   Approx Degree of Impairment: 46.58%   Standardized Score (AM-PAC Scale): 43.63   CMS Modifier (G-Code): CK    FUNCTIONAL ABILITY STATUS  Gait Assessment   Functional Mobility/Gait Assessment  Gait Assistance: Contact guard assist  Distance (ft): 5  Assistive Device: Rolling walker  Pattern: Shuffle  Stairs: Stairs    Skilled Therapy Provided     Bed Mobility:  Rolling: NT  Supine to sit: CGA   Sit to supine: NT     Transfer Mobility:  Sit to stand: CGA   Stand to sit: CGA  Gait = CGA 5ft using RW    Therapist's Comments: Pt was on AVAPS but RN cleared pt to participate in standing activity. Pt was able to perform steps from the bed to the chair using the RW. Pt reports no increase in work of breath. Since pt is of the AVAPS pt was not able to ambulate longer distance.     Exercise/Education Provided:  Bed mobility  Body mechanics  Gait training  Transfer training    Patient End of Session: Up in chair, Needs met, RN aware of session/findings, All patient questions and concerns addressed, Alarm set      Patient Evaluation Complexity Level:  History Moderate - 1 or 2 personal factors and/or co-morbidities   Examination of body systems Low -  addressing 1-2 elements   Clinical Presentation Low- Stable   Clinical Decision Making Low Complexity       PT Session Time: 23 minutes  Therapeutic Activity: 15 minutes

## 2025-06-03 NOTE — PAYOR COMM NOTE
--------------  ADMISSION REVIEW   6/1-6/3  Payor: [employer]  Subscriber #:  [id number]  Authorization Number: [id number]    Admit date: 6/2/25  Admit time: 12:44 AM       REVIEW DOCUMENTATION:     ED Provider Notes        ED Provider Notes signed by Beau Evans MD at 6/2/2025  2:55 AM       Author: Beau Evans MD Service: Emergency Medicine Author Type: Physician    Filed: 6/2/2025  2:55 AM Date of Service: 6/1/2025  9:29 PM Status: Signed    : Beau Evans MD (Physician)           Patient Seen in: [facility] Emergency Department        History  Chief Complaint   Patient presents with    Nausea/Vomiting/Diarrhea    Fever    Weakness     Stated Complaint: Fevers, diarhea, weakness    Subjective:   HPI            84-year-old male presents today for evaluation of fevers.  On Thursday, patient began having fevers.  He has had associated nausea, diarrhea, cough, abdominal pain and shortness of breath.  He denies any recent antibiotic use.  Family notes he has been more confused over the last several days.  He was weak today which prompted ER evaluation.      Objective:     Past Medical History:    Arrhythmia    Heart attack (HCC)    High blood pressure    High cholesterol    Obesity, unspecified    Other and unspecified hyperlipidemia    Prediabetes    Thyroid disease    Unspecified essential hypertension              Past Surgical History:   Procedure Laterality Date    Angioplasty (coronary)             Physical Exam    ED Triage Vitals   BP 06/01/25 2010 (!) 176/61   Pulse 06/01/25 2010 70   Resp 06/01/25 2010 20   Temp 06/01/25 2018 99.4 °F (37.4 °C)   Temp src 06/01/25 2018 Temporal   SpO2 06/01/25 2010 92 %   O2 Device 06/01/25 2010 Nasal cannula       Current Vitals:   Vital Signs  BP: 117/45  Pulse: 64  Resp: 20  Temp: 99.7 °F (37.6 °C)  Temp src: Oral  MAP (mmHg): (!) 62    Oxygen Therapy  SpO2: (!) 89 % (RN Notified)  O2 Device: Simple mask  [DrClaudia  ___] : Dr. TILLMAN (Oxymask)  O2 Flow Rate (L/min): 10 L/min  Pulse Oximetry Type: Continuous  Oximetry Probe Site Changed: No  Pulse Ox Probe Location: Left hand      Physical Exam  Vitals and nursing note reviewed.   Constitutional:       Appearance: Normal appearance.   HENT:      Head: Normocephalic.      Nose: Nose normal.      Mouth/Throat:      Mouth: Mucous membranes are moist.   Eyes:      Extraocular Movements: Extraocular movements intact.   Cardiovascular:      Rate and Rhythm: Normal rate and regular rhythm.   Pulmonary:      Effort: Pulmonary effort is normal.      Breath sounds: Normal breath sounds.   Abdominal:      General: Abdomen is flat.      Comments: Left lower quadrant tenderness without guarding or rigidity   Musculoskeletal:         General: Normal range of motion.   Skin:     General: Skin is warm.   Neurological:      General: No focal deficit present.      Mental Status: He is alert.   Psychiatric:         Mood and Affect: Mood normal.   ED Course  Labs Reviewed   CBC WITH DIFFERENTIAL WITH PLATELET - Abnormal; Notable for the following components:       Result Value    HGB 11.8 (*)     HCT 35.0 (*)     Lymphocyte Absolute 0.32 (*)     All other components within normal limits   COMP METABOLIC PANEL (14) - Abnormal; Notable for the following components:    Glucose 191 (*)     Sodium 135 (*)     Chloride 95 (*)     BUN 42 (*)     Creatinine 2.43 (*)     Calculated Osmolality 296 (*)     eGFR-Cr 26 (*)     AST 88 (*)     ALT 53 (*)     Alkaline Phosphatase 35 (*)     Albumin 4.9 (*)     All other components within normal limits   TROPONIN I HIGH SENSITIVITY - Abnormal; Notable for the following components:    Troponin I (High Sensitivity) 119 (*)     All other components within normal limits   LIPID PANEL - Abnormal; Notable for the following components:    HDL Cholesterol 19 (*)     All other components within normal limits   URINALYSIS WITH CULTURE REFLEX - Abnormal; Notable for the following components:  [DrClaudia ___] : Dr. TILLMAN    Clarity Urine Turbid (*)     Blood Urine 2+ (*)     Protein Urine 100 (*)     Urobilinogen Urine 2 (*)     WBC Urine 6-10 (*)     RBC Urine 3-5 (*)     All other components within normal limits   LACTIC ACID, PLASMA - Normal   TROPONIN I HIGH SENSITIVITY   TROPONIN I HIGH SENSITIVITY   HEMOGLOBIN A1C   BLOOD CULTURE   BLOOD CULTURE   C. DIFFICILE(TOXIGENIC)PCR   GI STOOL PANEL BY PCR   SPUTUM CULTURE     EKG    Rate, intervals and axes as noted on EKG Report.  Rate: 69  Rhythm: Sinus Rhythm  Reading: No STEMI              CT ABDOMEN+PELVIS(CPT=74176)  Result Date: 6/1/2025  PROCEDURE:  CT ABDOMEN+PELVIS (CPT=74176)  COMPARISON:  None.  INDICATIONS:  Fevers, diarhea, weakness  TECHNIQUE:  Unenhanced multislice CT scanning was performed from the dome of the diaphragm to the pubic symphysis.  Dose reduction techniques were used. Dose information is transmitted to the ACR (American College of Radiology) NRDR (National Radiology Data Registry) which includes the Dose Index Registry.  PATIENT STATED HISTORY: (As transcribed by Technologist)  Fevers, diarrhea, weakness for five days    FINDINGS:  LUNG BASE:  Left pleural effusion with atelectasis/infiltrate in the left lung base.. LIVER:  Mild diffuse fatty infiltration of the liver.. BILIARY:  No biliary ductal dilatation. PANCREAS:  Unremarkable. SPLEEN:  Normal caliber. KIDNEYS:  No hydronephrosis or nephrolithiasis ADRENALS:  Normal. AORTA/VASCULAR:  No aneurysm.  Aortoiliac calcification. RETROPERITONEUM:  No enlarged adenopathy. BOWEL/MESENTERY:  Normal caliber bowel loops. Uncomplicated colonic diverticulosis ABDOMINAL WALL:  Bilateral fat containing inguinal hernias.. PELVIC ORGANS:  Mild wall thickening of the urinary bladder.  This is nonspecific.  Recommend clinical correlation to exclude cystitis.  Prostatic hypertrophy and calcification.  BONES:  Hypertrophic endplate changes thoracic and lumbar spine.  Mild degenerative changes in the lower lumbar facets.              CONCLUSION:  Left pleural effusion with atelectasis/infiltrate in the left lung base.  Pneumonia should be excluded.  Colonic diverticulosis.  Mild wall thickening of the urinary bladder.  Recommend clinical correlation to exclude cystitis.  Prostatic hypertrophy.  Bilateral fat containing inguinal hernias.  LOCATION:  [facility]   Dictated by (CST): Brenda Fleming MD on 6/01/2025 at 11:02 PM     Finalized by (CST): Brenda Fleming MD on 6/01/2025 at 11:05 PM       XR CHEST AP PORTABLE  (CPT=71045)  Result Date: 6/1/2025  PROCEDURE:  XR CHEST AP PORTABLE  (CPT=71045)  TECHNIQUE:  AP chest radiograph was obtained.  COMPARISON:  [facility] , XR, XR CHEST AP PORTABLE  (CPT=71045), 1/02/2023, 12:19 PM.  INDICATIONS:  Fevers, diarhea, weakness  PATIENT STATED HISTORY: (As transcribed by Technologist)              CONCLUSION:  Patchy airspace disease in the left upper lobe concerning for pneumonia.  Stable heart size and pulmonary vascularity.   LOCATION:  [facility]      Dictated by (CST): Brenda Fleming MD on 6/01/2025 at 9:21 PM     Finalized by (CST): Brenda Fleming MD on 6/01/2025 at 9:21 PM         [facility]     Differential Diagnosis  84-year-old male presents today for evaluation of several days of fever, diarrhea, weakness, shortness of breath and altered mental status.  On my assessment, patient is hemodynamic stable and in no acute distress.  He was hypoxic however O2 sat improved on nasal cannula.  He has left lower quadrant tenderness on exam without guarding or rigidity.  Differential includes sepsis, bacteremia, pneumonia, colitis, diverticulitis.  Plan for CT, x-ray along with labs.    12:07 am  Patient's imaging concerning for pneumonia.  IV antibiotics ordered.  Additionally, his BUN and creatinine are elevated from previous levels, secondary to either decreased oral intake versus sepsis.  With patient's hypoxia and findings today, will admit to the hospital for continued care.  I notified the hospitalist of  need for admission.  Patient and family updated on plan and agreeable to admission.    A total of 35 minutes of critical care time (exclusive of billable procedures) was administered to manage the patient's unstable vital signs due to his hypoxemic respiratory failure secondary to pneumonia and sepsis.  This involved direct patient intervention, complex decision making, and/or extensive discussions with the patient, family, and clinical staff.    External Chart Reviewed  BUN 30 and creatinine 1.64 in September 25 of 2024    History from [facility]  Family helps supplement history    Discussions of Management  I notified hospitalist of need for admission    Admission disposition: 6/2/2025 12:07 AM      Disposition and Plan     Clinical Impression:  1. Community acquired pneumonia of left upper lobe of lung    2. MARYANN (acute kidney injury)    3. Acute hypoxemic respiratory failure (HCC)    4. Elevated troponin    5. Sepsis due to pneumonia (HCC)         Disposition:  Admit  6/2/2025 12:07 am    Hospital Problems       Present on Admission  Date Reviewed: 6/23/2015          ICD-10-CM Noted POA    * (Principal) Community acquired pneumonia of left upper lobe of lung J18.9 6/1/2025 Unknown    Acute hypoxemic respiratory failure (HCC) J96.01 6/2/2025 Unknown    MARYANN (acute kidney injury) N17.9 6/2/2025 Unknown           Sepsis dx documented at 6/2/2025  2:53 AM        6/2 H&P    Chief Complaint: diarrhea, weakness     Subjective:  History of Present Illness:      Steven Vargas is a 84 year old male with PMHx HLD/ HTN/ CAD/ SVT/ hypothyroidism who presented to the hospital for weakness. He reports having progressive weakness for the past 7 days to the point he was mostly laying in  bed. He felt nauseous and had decreased PO intake. He lso noticed non-bloody diarrhea several times daily and a temp of 103 F. He denied anyu cough, dyspnea, abdominal pain.    /55   Pulse 67   Temp (!) 103.1 °F (39.5 °C)  (Oral)   Resp 26   Wt 245 lb (111.1 kg)   SpO2 91%   BMI 33.23 kg/m²   General: No acute distress, Alert  Respiratory: scattered wheezing and rhonchi, on 10 LPM oxymask  Cardiovascular: S1, S2. Regular rate and rhythm  Abdomen: Soft, Non-tender, non-distended, positive bowel sounds  Neuro: No new focal deficits  Extremities: No edema  Assessment & Plan:  #Sepsis 2/2 PNA with acute hypoxic respiratory failure  -chest xray showing patchy airspace ds in LIANE concerning for PNA  -CT A/P showing lft pleural effusion with atelectasis vs infiltrate, mild wall thickening of bladder, bl fat containing inguinal hernias  -T 103.1 F, HR 91, RR 26  -SpO2 as low as 87% on room air  -antibiotics: azithro, rocephin  -obtain sputum cx if able  -blood cx pending  -nebs, mucinex, tylenol  -wean o2 as able with goal SpO2 >94%     #MARYANN on CKD 3b  -Cr 2.43 with BUN 42: baseline 1.64 with GFR 42  -Bun: Cr ratio <20 suggestive of intra-renal vs post-renal  -check PVR  -monitor BMP  -strict I/O, avoid nephrotoxins     #elevated troponin  -troponin 119  -EKG showing NSR @69 bpm, PAC's  -suspect ype II spill from sepsis and renal ds  -cont to trend troponin until peak  -monitor on telemetry     #elevated aminotransferases  -Alk phos 296, AST 35, ALT 88  -likely 2/2 infection as above  -cont to monitor LFTs  -given diarrhea send Gi panel, C Diff     #DM  -glucose 191  -SSI, QID checks, hypoglycemia protocol  -hold home metformin     #normocytic anemia  -hgb 11.8: last 12,7  -no signs active bleeding  -cont to monitor CBC     #pseudohyponatremia  -Na corrects to normal for hyperglycemia     #HTN  -hold home amlodipine/ hydralazine/ lisinopril-HCTZ due to borderline low BP     #CAD  -cont home ASA, statin, carvedilol     #HLD  -cont home ezetimibe, fenofibrate     #hypothyroidism  -cont home levothyroxine          6/2 cardiology    Reason for Consultation:  NSTEMI        History of Present Illness:  Steven Vargas is a a(n) 84 year old  male. Nice older trey, follows with Dr. Dyer.  Report of remote coronary stenting.  Unremarkable echo and cardiac PET late 2024.  H/o HTN, HL, PSVT.  He presents with one weak h/o diarrhea, fever, anorexia, and nausea.  In the ER, was in ARF, Temp 103, trop's borderline.  EKG w/o ischemic changes.  He denies any chest pain.  Found to by hypoxic, increasing O2 needs.  CXR with extensive left lung infiltrate.      Impression:   NSTEMI - in setting of ARF.  No clear angina.  EKG non ischemic.  CAD with remote coronary PCI, no details in EPIC.  Normal stress, preserved EF late 2024.  Fever, diarrhea, nausea, anorexia - per hospitalist.  Acute hypoxic resp failure with extensive left sided pneumonia.  Acute on CRI - dehydrated/septic.  H/o HTN     Plan:  Await echo.  No further troponins needed.  Continue plan per hospitalist.  Await pulm visit, his CXR is impressive.  Holding BP meds other than carvedilol, agree.  Daily labs.  20 mg IV lasix ordered, not certain he has extra volume accounting for his worsening hypoxia.          6/2 Pulmonary    Reason for Consultation:  Pneumonia, hypoxia     History of Present Illness:  Steven Vargas is a a(n) 84 year old male former smoker with PMH CAD, HTN, HLD, obesity, hypothyroidism who is admitted with worsening weakness and diarrhea. He and his daughter explain over the past 5 days or so he has felt weak and had fevers/chills at home. Family has noted he seemed confused at times. He was advised to come to hospital but declined until yesterday. He was noted hypoxic on admission and chest imaging with concern for pneumonia. He has had worsening hypoxia overnight leading to pulmonary consultation. He presently feels 'fine', denies cough, phlegm, pain.  Denies previous hx of lung disease.       ASSESSMENT  Acute hypoxic respiratory failure due to pneumonia  Sepsis due to pneumonia  LIANE pneumonia, community acquired pneumonia  CAD, elevated troponin/ NSTEMI  Elevated  LFTs  Diarrhea  Acute/chronic renal failure  HTN      PLAN  Continue close monitoring of respiratory status, wean o2 for sats >89%  Continue empiric abx, await cx. Send sputum if able; blood cx pending  Send urine legionella, strep  Start scheduled nebulized bronchodilators  Start adjunctive steroids for pneumonia  Discussed with patient and daughter at bedside regarding his illness and possibility this could worsen including worsening hypoxia and respiratory failure. He does not want to consider intubation.  If worsens, would consider NIPPV. Will need to clarify with patient and wife/POA            6/3 pulmonary  On 40L 70% vapo. States he has a dry cough. Denies SOB. Been working on his IS/flutter valve.         06/01/25 2112 06/03/25  0424   WBC 7.4 5.9   HGB 11.8* 11.4*   .0 242.0           Recent Labs     06/01/25 2112 06/03/25  0424   * 135*  135*   K 3.6 4.0  4.0   CL 95* 99  99   CO2 26.0 24.0  24.0   BUN 42* 55*  55*   CREATSERUM 2.43* 2.15*  2.15*       ASSESSMENT  Acute hypoxic respiratory failure due to pneumonia  Sepsis due to pneumonia  LIANE pneumonia, community acquired pneumonia  CAD, elevated troponin/ NSTEMI  Elevated LFTs  Diarrhea  Acute/chronic renal failure  HTN   Negative urine strep, POSITIVE urine legionella      PLAN  Continue close monitoring of respiratory status, wean o2 for sats >89%  Continue azithromycin and rocephin for pneumonia    Sputum pending; blood cx pending  Continue scheduled nebulized bronchodilators  Continue adjunctive steroids for pneumonia  If respiratory status worsens, would consider NIPPV. Will need to clarify with patient and wife/POA        6/3 cardiology    Subjective:  Remains on vapotherm.       azithromycin  500 mg Intravenous Q24H    cefTRIAXone  2 g Intravenous Q24H    ipratropium-albuterol  3 mL Nebulization Q6H WA    methylPREDNISolone  40 mg Intravenous Q8H       Impression:  NSTEMI - in setting of ARF.  No clear angina.  EKG non  ischemic.  CAD with remote coronary PCI, no details in EPIC.  Normal stress, preserved EF late 2024.  Fever, diarrhea, nausea, anorexia - per hospitalist.  Acute hypoxic resp failure with extensive left sided pneumonia. +Legionalla.  Acute on CRI - dehydrated/septic.  H/o HTN              Plan:  Await echo.  If EF preserved, no further cardiac w/u.  No obvious heart failure clinically.      MEDICATIONS ADMINISTERED IN LAST 1 DAY:  aspirin chewable tab 81 mg       Date Action Dose Route User    6/3/2025 0918 Given 81 mg Oral Lacie Goldstein RN          atorvastatin (Lipitor) tab 20 mg       Date Action Dose Route User    6/2/2025 2147 Given 20 mg Oral Jim Ambrose RN          azithromycin (Zithromax) tab 500 mg       Date Action Dose Route User    6/2/2025 2155 Given 500 mg Oral Jim Ambrose RN          carvedilol (Coreg) tab 12.5 mg       Date Action Dose Route User    6/3/2025 0918 Given 12.5 mg Oral Lacie Goldstein RN    6/2/2025 1643 Given 12.5 mg Oral Lacie Goldstein RN          cefTRIAXone (Rocephin) 2 g in sodium chloride 0.9% 100 mL IVPB-ADDV       Date Action Dose Route User    6/3/2025 0543 New Bag 2 g Intravenous Jim Ambrose RN          ezetimibe (Zetia) tab 10 mg       Date Action Dose Route User    6/2/2025 2147 Given 10 mg Oral Jim Ambrose RN          fenofibrate micronized (Lofibra) cap 134 mg       Date Action Dose Route User    6/3/2025 0918 Given 134 mg Oral Lacie Goldstein RN          guaiFENesin ER (Mucinex) 12 hr tab 600 mg       Date Action Dose Route User    6/3/2025 0918 Given 600 mg Oral Lacie Goldstein RN    6/2/2025 2147 Given 600 mg Oral Jim Ambrose RN          heparin (Porcine) 5000 UNIT/ML injection 5,000 Units       Date Action Dose Route User    6/3/2025 0918 Given 5,000 Units Subcutaneous (Right Lower Abdomen) Lacie Goldstein RN    6/2/2025 2147 Given 5,000 Units Subcutaneous (Left Lower Abdomen) Jim Ambrose RN          insulin aspart (NovoLOG) 100 Units/mL  FlexPen 1-68 Units       Date Action Dose Route User    6/3/2025 1214 Given 3 Units Subcutaneous (Left Upper Arm) Lacie Goldstein RN    6/3/2025 0933 Given 3 Units Subcutaneous (Left Lower Abdomen) Lacie Godlstein RN          insulin aspart (NovoLOG) 100 Units/mL FlexPen 1-10 Units       Date Action Dose Route User    6/3/2025 0544 Given 3 Units Subcutaneous (Left Upper Arm) Jim Ambrose RN    6/2/2025 2147 Given 4 Units Subcutaneous (Left Lower Abdomen) Jim Ambrose RN    6/2/2025 1642 Given 2 Units Subcutaneous (Left Upper Arm) Lacie Goldstein RN          insulin NPH-human isophane (Novolin N) 100 Units/mL injection 10 Units       Date Action Dose Route User    6/3/2025 1022 Given 10 Units Subcutaneous (Left Upper Arm) Lacie Goldstein RN          ipratropium-albuterol (Duoneb) 0.5-2.5 (3) MG/3ML inhalation solution 3 mL       Date Action Dose Route User    6/3/2025 1343 Given 3 mL Nebulization Lacie Booth RCP    6/3/2025 0717 Given 3 mL Nebulization Lacie Booth RCP    6/2/2025 1825 Given 3 mL Nebulization Jerica Perkins          levothyroxine (Synthroid) tab 88 mcg       Date Action Dose Route User    6/3/2025 0543 Given 88 mcg Oral Jim Ambrose RN          methylPREDNISolone sodium succinate (Solu-MEDROL) injection 40 mg       Date Action Dose Route User    6/3/2025 1214 Given 40 mg Intravenous Lacie Goldstein RN    6/3/2025 0543 Given 40 mg Intravenous iJm Ambrose RN    6/2/2025 2147 Given 40 mg Intravenous Jim Ambrose RN          cholecalciferol (Vitamin D3) tab 5,000 Units       Date Action Dose Route User    6/3/2025 0918 Given 5,000 Units Oral Lacie Goldstein RN            Vitals (last day)       Date/Time Temp Pulse Resp BP SpO2 Weight O2 Device O2 Flow Rate (L/min) Who    06/03/25 1343 -- -- -- -- 93 % -- High flow/High humidity 35 L/min AF    06/03/25 1200 97.7 °F (36.5 °C) 55 16 115/56 97 % -- High flow/High humidity 35 L/min MARINA    06/03/25 1142 -- -- -- -- 98 % -- High  flow/High humidity 35 L/min EO    06/03/25 0918 -- 69 -- -- -- -- -- -- AM    06/03/25 0819 98.1 °F (36.7 °C) 67 18 125/71 95 % -- High flow/High humidity -- EF    06/03/25 0717 -- 62 18 -- 94 % -- High flow/High humidity 40 L/min AF    06/03/25 0500 -- -- -- -- -- -- High flow/High humidity 40 L/min FB    06/03/25 0350 98.1 °F (36.7 °C) 49 16 130/53 90 % -- High flow/High humidity 40 L/min LK    06/03/25 0100 -- -- -- -- -- -- High flow/High humidity 40 L/min FB    06/02/25 2340 98.6 °F (37 °C) 51 16 130/57 92 % -- High flow/High humidity 40 L/min LK    06/02/25 2029 -- -- -- -- -- -- High flow/High humidity 40 L/min AG    06/02/25 1940 98 °F (36.7 °C) 52 16 131/46 95 % -- High flow nasal cannula 40 L/min LK    06/02/25 1825 -- -- -- -- -- -- High flow nasal cannula 40 L/min HD    06/02/25 1800 -- -- -- -- -- -- High flow nasal cannula -- HD    06/02/25 1800 -- -- -- -- 95 % -- -- 40 L/min AM    06/02/25 1719 -- 70 -- -- -- -- -- -- AM    06/02/25 1700 -- 64 -- -- -- -- -- -- AM    06/02/25 1643 -- 61 -- -- -- -- -- -- AM    06/02/25 1543 97.7 °F (36.5 °C) 50 16 115/49 94 % -- High flow/High humidity 40 L/min EV    06/02/25 1400 -- 58 -- -- 97 % -- -- --     06/02/25 1400 -- -- -- -- -- -- High flow/High humidity 40 L/min     06/02/25 1354 99.5 °F (37.5 °C) -- -- -- -- -- -- -- EV    06/02/25 1213 101.8 °F (38.8 °C) 67 18 146/56 96 % -- High flow/High humidity 40 L/min     06/02/25 0935 -- 72 18 148/52 95 % -- High flow/High humidity 40 L/min     06/02/25 0851 -- -- -- -- 96 % -- -- --     06/02/25 0837 -- -- -- -- -- -- High flow/High humidity 40 L/min [place]    06/02/25 0833 98.6 °F (37 °C) 71 18 127/46 97 % -- High flow/High humidity 40 L/min     06/02/25 0821 -- -- -- -- -- -- High flow/High humidity 40 L/min  [place]    06/02/25 0725 -- -- -- -- 89 % -- Non-rebreather mask 15 L/min     06/02/25 0405 98.5 °F (36.9 °C) 56 20 140/63 91 % -- Simple mask 10 L/min     06/02/25 0121 -- -- -- -- -- -- Simple  mask 10 L/min     06/02/25 0105 99.7 °F (37.6 °C) 64 20 117/45 89 % -- High flow nasal cannula 9 L/min     06/02/25 0104 -- -- -- -- -- 255 lb (115.7 kg) -- -- AD    06/02/25 0000 -- 67 26 138/55 91 % -- Nasal cannula 5 L/min CS          CIWA Scores (since admission)       None

## 2025-06-03 NOTE — PLAN OF CARE
A/o x4.40L 70% FiO2 denies SOB/CP. IV abx. Tele:SB. Cardiac diet with qid accuchecks. IV steroids. Ucx and sputum cx pending. GI stool panel pending no stool this evening. Denies pain. POC updated with pt. All safety measures in place. Call light within reach instructed pt to call for help or assistance.

## 2025-06-03 NOTE — PROGRESS NOTES
Cardiology Progress Note        Steven Vargas Patient Status:  Inpatient    1940 MRN [id number]   [facility] 3SW-A Attending Gaetano Jaramillo MD   Hosp Day # 1 PCP Geremias Dyer MD     Subjective:  Remains on vapotherm.    Medications:  Scheduled Medications[1]    Continuous Infusions:  Medication Infusions[2]      Allergies:  Allergies[3]      Intake/Output:    Intake/Output Summary (Last 24 hours) at 6/3/2025 0935  Last data filed at 6/3/2025 0819  Gross per 24 hour   Intake --   Output 1200 ml   Net -1200 ml           Last 3 Weights   25 0104 255 lb (115.7 kg)   25 245 lb (111.1 kg)   23 1615 256 lb 6.3 oz (116.3 kg)   23 1151 250 lb (113.4 kg)   16 1426 260 lb (117.9 kg)            Physical Exam:    Temp:  [97.7 °F (36.5 °C)-101.8 °F (38.8 °C)] 98.1 °F (36.7 °C)  Pulse:  [49-70] 69  Resp:  [16-18] 18  BP: (115-146)/(46-71) 125/71  SpO2:  [90 %-97 %] 95 %  FiO2 (%):  [70 %-85 %] 70 %    General: No apparent distress  HEENT: No focal deficits.  Neck: supple. JVP normal  Cardiac: Regular rhythm, S1, S2 normal,  rub or gallop.  No murmur.  Lungs: crackles left.  Abdomen: Soft, non-tender.   Extremities: No edema  Neurologic: no focal deficits  Skin: Warm and dry.     Telemetry: SB    EKG:      Echo:      Cardiac Cath:      Labs:  HEM:  Recent Labs   Lab 25  0424   WBC 7.4 5.9   HGB 11.8* 11.4*   .0 242.0       Chem:  Recent Labs   Lab 25  0424   * 135*  135*   K 3.6 4.0  4.0   CL 95* 99  99   CO2 26.0 24.0  24.0   BUN 42* 55*  55*   CREATSERUM 2.43* 2.15*  2.15*   CA 9.4 8.8  8.8   * 226*  226*       Recent Labs   Lab 25  2112 25  0424   ALT 53* 66*   AST 88* 87*   ALB 4.9* 4.1       No results for input(s): \"TROP\", \"CK\" in the last 168 hours.    No results for input(s): \"PTP\", \"INR\" in the last 168 hours.              Impression:  NSTEMI - in setting of ARF.  No  clear angina.  EKG non ischemic.  CAD with remote coronary PCI, no details in EPIC.  Normal stress, preserved EF late 2024.  Fever, diarrhea, nausea, anorexia - per hospitalist.  Acute hypoxic resp failure with extensive left sided pneumonia. +[place].  Acute on CRI - dehydrated/septic.  H/o HTN          Plan:  Await echo.  If EF preserved, no further cardiac w/u.  No obvious heart failure clinically.        Marcial Abraham MD  6/3/2025  9:35 AM  L3         [1]    insulin degludec  10 Units Subcutaneous Nightly    insulin NPH-human isophane  10 Units Subcutaneous Q12H    azithromycin  500 mg Intravenous Q24H    heparin  5,000 Units Subcutaneous 2 times per day    insulin aspart  1-68 Units Subcutaneous TID CC    guaiFENesin ER  600 mg Oral BID    cefTRIAXone  2 g Intravenous Q24H    aspirin  81 mg Oral Daily    atorvastatin  20 mg Oral Nightly    carvedilol  12.5 mg Oral BID with meals    cholecalciferol  5,000 Units Oral Daily    ezetimibe  10 mg Oral Nightly    fenofibrate micronized  134 mg Oral Daily with breakfast    ipratropium-albuterol  3 mL Nebulization Q6H WA    levothyroxine  88 mcg Oral Before breakfast    methylPREDNISolone  40 mg Intravenous Q8H   [2] [3] No Known Allergies

## 2025-06-04 ENCOUNTER — APPOINTMENT (OUTPATIENT)
Dept: CV DIAGNOSTICS | Facility: HOSPITAL | Age: 85
End: 2025-06-04
Attending: INTERNAL MEDICINE
Payer: MEDICARE

## 2025-06-04 LAB
ANION GAP SERPL CALC-SCNC: 13 MMOL/L (ref 0–18)
ATRIAL RATE: 156 BPM
BUN BLD-MCNC: 60 MG/DL (ref 9–23)
CALCIUM BLD-MCNC: 9.2 MG/DL (ref 8.7–10.6)
CHLORIDE SERPL-SCNC: 101 MMOL/L (ref 98–112)
CO2 SERPL-SCNC: 25 MMOL/L (ref 21–32)
CREAT BLD-MCNC: 1.97 MG/DL (ref 0.7–1.3)
EGFRCR SERPLBLD CKD-EPI 2021: 33 ML/MIN/1.73M2 (ref 60–?)
GLUCOSE BLD-MCNC: 203 MG/DL (ref 70–99)
GLUCOSE BLD-MCNC: 207 MG/DL (ref 70–99)
GLUCOSE BLD-MCNC: 213 MG/DL (ref 70–99)
GLUCOSE BLD-MCNC: 222 MG/DL (ref 70–99)
GLUCOSE BLD-MCNC: 241 MG/DL (ref 70–99)
OSMOLALITY SERPL CALC.SUM OF ELEC: 311 MOSM/KG (ref 275–295)
POTASSIUM SERPL-SCNC: 3.5 MMOL/L (ref 3.5–5.1)
Q-T INTERVAL: 356 MS
QRS DURATION: 98 MS
QTC CALCULATION (BEZET): 459 MS
R AXIS: -18 DEGREES
SODIUM SERPL-SCNC: 139 MMOL/L (ref 136–145)
T AXIS: 64 DEGREES
VENTRICULAR RATE: 100 BPM

## 2025-06-04 PROCEDURE — 93306 TTE W/DOPPLER COMPLETE: CPT | Performed by: INTERNAL MEDICINE

## 2025-06-04 PROCEDURE — 99232 SBSQ HOSP IP/OBS MODERATE 35: CPT | Performed by: INTERNAL MEDICINE

## 2025-06-04 RX ORDER — INSULIN DEGLUDEC 100 U/ML
12 INJECTION, SOLUTION SUBCUTANEOUS NIGHTLY
Status: DISCONTINUED | OUTPATIENT
Start: 2025-06-04 | End: 2025-06-07

## 2025-06-04 NOTE — CM/SW NOTE
Department  notified of request for HHC, aidin referrals started. Assigned CM/SW to follow up with pt/family on further discharge planning.     Zainab Paulson, DSC

## 2025-06-04 NOTE — PROGRESS NOTES
[facility]   part of [facility]     Hospitalist Progress Note     Steven Vargas Patient Status:  Inpatient    1940 MRN [id number]   Location [facility] 3SW-A Attending Gaetano Jaramillo MD   Hosp Day # 2 PCP Geremias Dyer MD     Chief Complaint: SOB / weakness     Subjective:     Patient on vapotherm states he feels a bit better. In good spirit. Afib on tele     Objective:    Review of Systems:   A comprehensive review of systems was completed; pertinent positive and negatives stated in subjective.    Vital signs:  Temp:  [97.7 °F (36.5 °C)-98.6 °F (37 °C)] 97.9 °F (36.6 °C)  Pulse:  [54-69] 54  Resp:  [16-18] 18  BP: (115-141)/(56-71) 141/65  SpO2:  [93 %-98 %] 94 %  FiO2 (%):  [45 %-50 %] 45 %    Physical Exam:    General: No acute distress  Respiratory: Rhonchi to left lung, no wheeze  Cardiovascular: S1, S2, regular rate and rhythm  Abdomen: Soft, Non-tender, non-distended, positive bowel sounds  Neuro: No new focal deficits.   Extremities: No edema    Diagnostic Data:    Labs:  Recent Labs   Lab 25  0424   WBC 7.4 5.9   HGB 11.8* 11.4*   MCV 90.2 90.9   .0 242.0       Recent Labs   Lab 254 25  0436   * 226*  226* 213*   BUN 42* 55*  55* 60*   CREATSERUM 2.43* 2.15*  2.15* 1.97*   CA 9.4 8.8  8.8 9.2   ALB 4.9* 4.1  --    * 135*  135* 139   K 3.6 4.0  4.0 3.5   CL 95* 99  99 101   CO2 26.0 24.0  24.0 25.0   ALKPHO 35* 34*  --    AST 88* 87*  --    ALT 53* 66*  --    BILT 0.9 0.5  --    TP 7.9 6.9  --        Estimated Glomerular Filtration Rate: 33 mL/min/1.73m2 (A) (result from lab).    Recent Labs   Lab 25  0553   TROPHS 119* 97*       No results for input(s): \"PTP\", \"INR\" in the last 168 hours.               Microbiology    Hospital Encounter on 25   1. Blood Culture     Status: None (Preliminary result)    Collection Time: 25  9:47 PM    Specimen: Blood,peripheral    Result Value Ref Range    Blood Culture Result No Growth 2 Days N/A         Imaging: Reviewed in Epic.    Medications: Scheduled Medications[1]    Assessment & Plan:      #Acute hypoxic respiratory failure  #Pneumonia - LIANE  #Sepsis due to pneumonia  Patient on Vapotherm now - wean as tolerated  F/u sputum cultures  Nebs, steroids  Zosyn  Pulm following    # Afib   - per cards   - rate is controlled     #CAD  ASA, statin, Coreg    #NSTEMI Type 2  -likely 2/2 sepsis , demand ischemia   -Echo  -Cards following     #MARYANN with CKD 3A  - monitor     #Diarrhea  -Improving   -On vanc prophylaxis for c diff    #DM Type 2  #Hyperglycemia 2/2 steroid  - increase NPH 14U BID for steroid use  -Degludec 10U nightly  -Carb coverage, ISS    #Hypothyroidism  Synthroid     #HLD  Statin, fibrate     Katie Mendoza MD    Supplementary Documentation:     Quality:  DVT Mechanical Prophylaxis:   SCDs,    DVT Pharmacologic Prophylaxis   Medication    heparin (Porcine) 5000 UNIT/ML injection 5,000 Units      DVT Pharmacologic prophylaxis: Aspirin 162 mg         Code Status: Not on file  Disla: No urinary catheter in place  Disla Duration (in days):   Central line:    EVELIA:     Discharge is dependent on: oxygen status   At this point Mr. Vargas is expected to be discharge to: Home    The 21st Century Cures Act makes medical notes like these available to patients in the interest of transparency. Please be advised this is a medical document. Medical documents are intended to carry relevant information, facts as evident, and the clinical opinion of the practitioner. The medical note is intended as peer to peer communication and may appear blunt or direct. It is written in medical language and may contain abbreviations or verbiage that are unfamiliar.                         [1]    insulin degludec  10 Units Subcutaneous Nightly    azithromycin  500 mg Intravenous Q24H    vancomycin  125 mg Oral Daily    insulin aspart  2-10 Units  Subcutaneous TID AC and HS    methylPREDNISolone  40 mg Intravenous Q12H    insulin NPH-human isophane  14 Units Subcutaneous Q12H    heparin  5,000 Units Subcutaneous 2 times per day    insulin aspart  1-68 Units Subcutaneous TID CC    guaiFENesin ER  600 mg Oral BID    aspirin  81 mg Oral Daily    atorvastatin  20 mg Oral Nightly    carvedilol  12.5 mg Oral BID with meals    cholecalciferol  5,000 Units Oral Daily    ezetimibe  10 mg Oral Nightly    fenofibrate micronized  134 mg Oral Daily with breakfast    ipratropium-albuterol  3 mL Nebulization Q6H WA    levothyroxine  88 mcg Oral Before breakfast

## 2025-06-04 NOTE — PROGRESS NOTES
[facility] Pulmonary Progress Note    Steven Vargas Patient Status:  Inpatient    1940 MRN [id number]   Location [facility] 3SW-A Attending Katie Mendoza MD   Hosp Day # 2 PCP Geremias Dyer MD     Subjective:  Overnight: No acute events overnight. Afebrile. On 33L 40% vapo. States he has a dry cough, feeling better. Denies SOB. Been working on his IS/flutter valve     Objective:  /78 (BP Location: Left arm)   Pulse 71   Temp 97.8 °F (36.6 °C) (Oral)   Resp 18   Wt 255 lb (115.7 kg)   SpO2 94%   BMI 34.58 kg/m²     Temp (24hrs), Av °F (36.7 °C), Min:97.7 °F (36.5 °C), Max:98.6 °F (37 °C)      Intake/Output:    Intake/Output Summary (Last 24 hours) at 2025 0919  Last data filed at 2025 0835  Gross per 24 hour   Intake 240 ml   Output 700 ml   Net -460 ml       Physical Exam:   General: alert, cooperative, oriented.  No respiratory distress.   Head: Normocephalic, without obvious abnormality, atraumatic.   Throat: Lips, mucosa, and tongue normal.  No thrush noted.   Neck: trachea midline, no adenopathy, no thyromegaly. No JVD.   Lungs: clear to auscultation bilaterally, diminished breath sounds bilaterally   Chest wall: No tenderness or deformity.   Heart: regular rate and rhythm, S1, S2 normal, no murmur, click, rub or gallop   Abdomen: soft, non-distended, no masses, no guarding, no     Rebound.   Extremity: No edema or cyanosis   Skin: No rashes or lesions.   Neurological: Alert, interactive, no focal deficits    Lab Data Review:  Recent Labs     25   WBC 7.4 5.9   HGB 11.8* 11.4*   .0 242.0     Recent Labs     25  0436   * 135*  135* 139   K 3.6 4.0  4.0 3.5   CL 95* 99  99 101   CO2 26.0 24.0  24.0 25.0   BUN 42* 55*  55* 60*   CREATSERUM 2.43* 2.15*  2.15* 1.97*     No results for input(s): \"PTP\", \"INR\", \"PTT\" in the last 168 hours.    Cultures:   Hospital Encounter on 25   1. Blood  Culture     Status: None (Preliminary result)    Collection Time: 06/01/25  9:47 PM    Specimen: Blood,peripheral   Result Value Ref Range    Blood Culture Result No Growth 2 Days N/A       Radiology:  XR CHEST AP PORTABLE  (CPT=71045)  Narrative: PROCEDURE:  XR CHEST AP PORTABLE  (CPT=71045)     TECHNIQUE:  AP chest radiograph was obtained.     COMPARISON:  [facility] , XR, XR CHEST AP PORTABLE  (CPT=71045), 6/01/2025, 9:09 PM.     INDICATIONS:  hypoxia     PATIENT STATED HISTORY: (As transcribed by Technologist)  Patient is having difficulty in breathing         FINDINGS:  Persistent increased airspace opacity involves left perihilar upper lobe.  No new focal consolidation, pleural effusion, or pneumothorax.  Cardiomediastinal silhouette is stable.                   Impression: CONCLUSION:    1. No significant change when compared to 6/1/2025 chest radiograph.        LOCATION:  [facility]                 Dictated by (CST): Melissa Armstrong MD on 6/02/2025 at 8:50 AM       Finalized by (CST): Melissa Armstrong MD on 6/02/2025 at 8:53 AM         Medications reviewed     ASSESSMENT  Acute hypoxic respiratory failure due to pneumonia  Sepsis due to pneumonia  LIANE pneumonia, community acquired pneumonia  CAD, elevated troponin/ NSTEMI  Elevated LFTs  Diarrhea  Acute/chronic renal failure  HTN   Negative urine strep, POSITIVE urine legionella likely related to his recent travel     PLAN  Continue close monitoring of respiratory status, wean o2 for sats >89%  Continue azithromycin for pneumonia for at least 10 days  Sputum pending; blood cx pending  Continue scheduled nebulized bronchodilators  Continue adjunctive steroids for pneumonia     Discussed with patient, Johanna Irene and Dr Lopes    Is this a shared or split note between Advanced Practice Provider and Physician? Yes   ARUN York  [facility] Pulmonary Medicine  Office: [phone]

## 2025-06-04 NOTE — PROGRESS NOTES
Progress Note  Steven Vargas Patient Status:  Inpatient    1940 MRN [id number]   Location [facility] 3SW-A Attending Katie Mendoza MD   Hosp Day # 2 PCP Geremias Dyer MD     Subjective:  In bed on exam. Currently getting echo. Main complaint is weakness.     Objective:  /65 (BP Location: Left arm)   Pulse 54   Temp 97.9 °F (36.6 °C) (Oral)   Resp 18   Wt 255 lb (115.7 kg)   SpO2 94%   BMI 34.58 kg/m²     Telemetry: afib      Intake/Output:    Intake/Output Summary (Last 24 hours) at 2025 0832  Last data filed at 2025 0427  Gross per 24 hour   Intake 240 ml   Output 400 ml   Net -160 ml       Last 3 Weights   25 0104 255 lb (115.7 kg)   25 245 lb (111.1 kg)   23 1615 256 lb 6.3 oz (116.3 kg)   23 1151 250 lb (113.4 kg)   16 1426 260 lb (117.9 kg)       Labs:  Recent Labs   Lab 254 25  0436   * 226*  226* 213*   BUN 42* 55*  55* 60*   CREATSERUM 2.43* 2.15*  2.15* 1.97*   EGFRCR 26* 30*  30* 33*   CA 9.4 8.8  8.8 9.2   * 135*  135* 139   K 3.6 4.0  4.0 3.5   CL 95* 99  99 101   CO2 26.0 24.0  24.0 25.0     Recent Labs   Lab 25  0424   RBC 3.88 3.84   HGB 11.8* 11.4*   HCT 35.0* 34.9*   MCV 90.2 90.9   MCH 30.4 29.7   MCHC 33.7 32.7   RDW 12.8 12.9   NEPRELIM 6.59 5.47   WBC 7.4 5.9   .0 242.0         Recent Labs   Lab 25  0553   TROPHS 119* 97*       Review of Systems   Cardiovascular:  Positive for irregular heartbeat.   Respiratory: Negative.         Physical Exam:    Gen: alert, oriented x 3, NAD  Heent: pupils equal, reactive. Mucous membranes moist.   Neck: no jvd  Cardiac: irregularly irregular, normal S1,S2  Lungs: CTA/dim, on vapotherm  Abd: soft, NT/ND +bs  Ext: no sig ble edema  Skin: Warm, dry  Neuro: No focal deficits        Medications:    Scheduled Medications[1]  Medication Infusions[2]        Assessment:  Acute hypoxic  respiratory failure, sepsis, in setting of pna presents with diarrhea, fever, anorexia, nausea  Abx per primary/pulm  ? New onset afib.   [id number] ~5  CAD s/p remote PCI  Cardiac PET 12/2024 normal perfusion  Historically preserved LVEF  Elevated troponin, demand ischemia 2/2 above, not c/w ACS  119>97  MARYANN/CKD  2/2 above--improving. Baseline ~1.3-1.5  DMII  Hypothyroid   HL-statin, zetia  HTN-controlled  Home norvasc, hydralazine, acei/hydrochlorothiazide on hold.   Cdiff +    Plan:  Echo pending. If no acute changes, no further cardiac work up.   Steroids/nebs, abx per primary/pulm  Cont current cardiac regimen. Home acei/hydrochlorothiazide, norvasc, hydralazine on hold. Can resume as bp/renal function allow.   Tele appears to be controlled afib, converted this am. This is a new diagnosis. Would recommend oral AC. Will price out eliquis 2.5mg po bid (based on age, weight, creat). Will arrange for OP EP consultation.     Plan of care discussed with patient, RN.    Dilia Morrell, APRN  6/4/2025  8:32 AM    I have personally seen and examined patient.   I have independently formulated assessment and plan  I agree with the AP note    Denies chest pain or pressure.  Physical exam:  He is euvolemic  Irregularly irregular, no murmurs  No edema    Assessment and plan:    Acute hypoxic respiratory failure secondary to pneumonia  Atrial fibrillation with elevated Channing Vasc  Nonischemic myocardial injury secondary to 1 and 2  Coronary artery disease  Hypertension    Patient noted to be in atrial fibrillation.  Telemetry was reviewed.  Currently rate controlled.  Discussed in detail regarding atrial fibrillation with patient.  Patient is aware due to prior conversations.  He had episodes of SVTs in December 2023 where there was concerns of A-fib.  Currently telemetry is convincing for atrial fibrillation.  Indication, risk and benefit of anticoagulations were discussed.  Patient is agreeable.    Start Eliquis 2.5 mg  twice daily.    Continue with Coreg 12.5 mg twice daily.    Cardiology service will sign off.  Follow-up with Dr. Dyer as an outpatient.    Thank you for the opportunity to participate in the care of your patient.     Tyree Roca MD  Interventional Cardiologist  [facility]               [1]    insulin degludec  10 Units Subcutaneous Nightly    azithromycin  500 mg Intravenous Q24H    vancomycin  125 mg Oral Daily    insulin aspart  2-10 Units Subcutaneous TID AC and HS    methylPREDNISolone  40 mg Intravenous Q12H    insulin NPH-human isophane  14 Units Subcutaneous Q12H    heparin  5,000 Units Subcutaneous 2 times per day    insulin aspart  1-68 Units Subcutaneous TID CC    guaiFENesin ER  600 mg Oral BID    aspirin  81 mg Oral Daily    atorvastatin  20 mg Oral Nightly    carvedilol  12.5 mg Oral BID with meals    cholecalciferol  5,000 Units Oral Daily    ezetimibe  10 mg Oral Nightly    fenofibrate micronized  134 mg Oral Daily with breakfast    ipratropium-albuterol  3 mL Nebulization Q6H WA    levothyroxine  88 mcg Oral Before breakfast   [2]

## 2025-06-04 NOTE — HOME CARE LIAISON
Received referral via [facility] for Home Health services. Noted PCP on file is JORY Dyer, which is patients cardiologist. Spoke with Clementine from Dr. Dyer office and confirmed they will not sign for home health care. Will speak with about about this at the bedside. Informed ELOY Bradshaw. Will offer TCC to patient at the bedside or the MD at home program as home health will not be able to start until having a MD to sign in the community

## 2025-06-04 NOTE — CM/SW NOTE
06/04/25 1500   Choice of Post-Acute Provider   Informed patient of right to choose their preferred provider Yes   List of appropriate post-acute services provided to patient/family with quality data Yes   Patient/family choice Residential   Information given to Patient;Spouse/Significant other   Residential HHC/Hospice financial disclosure given Yes     Sw met with pt and wife and also spoke to his daughter Cici [phone] to discuss dc planning.  Eliquis cost discussed- pt very upset about cost. Provided 30 day free coupon. Pt states he will not go home on a blood thinner and will just drop dead.     Pt states he is also a  and [facility]  Discussed using 30 day free Eliquis and seeing [facility] to [facility].    SW also discussed HHC - pt agreeable- list given [facility] chosen.    Family will try and work with pt to agree to use 30 day free coupon and [facility] with [facility].    Leeann Landis, JOANA  /Discharge Planner

## 2025-06-04 NOTE — PAYOR COMM NOTE
--------------  CONTINUED STAY REVIEW    Payor: [employer]  Subscriber #:  [id number]  Authorization Number: [id number]    Admit date: 25  Admit time: 12:44 AM    25    On 33L 40% vapo. States he has a dry cough, feeling better. Denies SOB. Been working on his IS/flutter valve        /78 (BP Location: Left arm)   Pulse 71   Temp 97.8 °F (36.6 °C) (Oral)   Resp 18   Wt 255 lb (115.7 kg)   SpO2 94%   BMI 34.58 kg/m²   Temp (24hrs), Av °F (36.7 °C), Min:97.7 °F (36.5 °C), Max:98.6 °F (37 °C)     Physical Exam:             General: alert, cooperative, oriented.               Head: Normocephalic, without obvious abnormality, atraumatic.             Throat: Lips, mucosa, and tongue normal.  No thrush noted.             Neck: trachea midline, no adenopathy, no thyromegaly. No JVD.             Lungs: clear to auscultation bilaterally, diminished breath sounds bilaterally             Chest wall: No tenderness or deformity.             Heart: regular rate and rhythm, S1, S2 normal, no murmur, click, rub or gallop             Abdomen: soft, non-distended, no masses, no guarding, no               Rebound.             Extremity: No edema or cyanosis             Skin: No rashes or lesions.             Neurological: Alert, interactive, no focal deficits       25  0424   WBC 7.4 5.9   HGB 11.8* 11.4*   .0 242.0        25  0424 25  0436   * 135*  135* 139   K 3.6 4.0  4.0 3.5   CL 95* 99  99 101   CO2 26.0 24.0  24.0 25.0   BUN 42* 55*  55* 60*   CREATSERUM 2.43* 2.15*  2.15* 1.97*      ASSESSMENT  Acute hypoxic respiratory failure due to pneumonia  Sepsis due to pneumonia  LIANE pneumonia, community acquired pneumonia  CAD, elevated troponin/ NSTEMI  Elevated LFTs  Diarrhea  Acute/chronic renal failure  HTN   Negative urine strep, POSITIVE urine legionella likely related to his recent travel     PLAN  Continue close monitoring of  respiratory status, wean o2 for sats >89%  Continue azithromycin for pneumonia for at least 10 days  Sputum pending; blood cx pending  Continue scheduled nebulized bronchodilators  Continue adjunctive steroids for pneumonia     MEDICATIONS ADMINISTERED IN LAST 1 DAY:  aspirin chewable tab 81 mg       Date Action Dose Route User    6/4/2025 0950 Given 81 mg Oral Johanna Simon RN          atorvastatin (Lipitor) tab 20 mg       Date Action Dose Route User    6/3/2025 2040 Given 20 mg Oral Genoveva Ross RN          azithromycin (Zithromax) 500 mg in sodium chloride 0.9% 250mL IVPB premix       Date Action Dose Route User    6/3/2025 2039 New Bag 500 mg Intravenous Genoveva Ross RN          carvedilol (Coreg) tab 12.5 mg       Date Action Dose Route User    6/4/2025 0950 Given 12.5 mg Oral Johanna Simon RN    6/3/2025 1707 Given 12.5 mg Oral TristonLacie RN          ezetimibe (Zetia) tab 10 mg       Date Action Dose Route User    6/3/2025 2040 Given 10 mg Oral Genoveva Ross RN          fenofibrate micronized (Lofibra) cap 134 mg       Date Action Dose Route User    6/4/2025 0950 Given 134 mg Oral Johanna Simon RN          guaiFENesin ER (Mucinex) 12 hr tab 600 mg       Date Action Dose Route User    6/4/2025 0950 Given 600 mg Oral Johanna Simon RN    6/3/2025 2040 Given 600 mg Oral Genoveva Ross RN          heparin (Porcine) 5000 UNIT/ML injection 5,000 Units       Date Action Dose Route User    6/4/2025 0950 Given 5,000 Units Subcutaneous (Left Lower Abdomen) Johanna Simon RN    6/3/2025 2039 Given 5,000 Units Subcutaneous (Left Lower Abdomen) Genoveva Ross RN          insulin aspart (NovoLOG) 100 Units/mL FlexPen 1-68 Units       Date Action Dose Route User    6/4/2025 0951 Given 3 Units Subcutaneous (Left Lower Arm) Johanna Simon RN          insulin aspart (NovoLOG) 100 Units/mL FlexPen 2-10 Units       Date Action Dose Route User    6/4/2025 0549 Given 6 Units Subcutaneous  (Left Upper Arm) Genoveva Ross RN    6/3/2025 2143 Given 8 Units Subcutaneous (Left Upper Arm) Genoveva Ross RN    6/3/2025 1707 Given 8 Units Subcutaneous (Left Upper Arm) Lacie Goldstein RN          insulin degludec (Tresiba) 100 units/mL flextouch 10 Units       Date Action Dose Route User    6/3/2025 2205 Given 10 Units Subcutaneous (Left Upper Arm) Genoveva Ross RN          insulin NPH-human isophane (Novolin N) 100 Units/mL injection 10 Units       Date Action Dose Route User    6/3/2025 2144 Given 10 Units Subcutaneous (Left Upper Arm) Genoveva Ross RN          insulin NPH-human isophane (Novolin N) 100 Units/mL injection 14 Units       Date Action Dose Route User    6/4/2025 0948 Given 14 Units Subcutaneous (Left Lower Arm) Johanna Simon RN          ipratropium-albuterol (Duoneb) 0.5-2.5 (3) MG/3ML inhalation solution 3 mL       Date Action Dose Route User    6/4/2025 1229 Given 3 mL Nebulization Lisa River, [facility]    6/4/2025 0710 Given 3 mL Nebulization Lisa River, [facility]    6/3/2025 1958 Given 3 mL Nebulization Ryanne Cox, [facility]    6/3/2025 1343 Given 3 mL Nebulization Lacie Booth, [facility]          levothyroxine (Synthroid) tab 88 mcg       Date Action Dose Route User    6/4/2025 0549 Given 88 mcg Oral Genoveva Ross RN          methylPREDNISolone sodium succinate (Solu-MEDROL) injection 40 mg       Date Action Dose Route User    6/4/2025 0008 Given 40 mg Intravenous Genoveva Ross RN     vancomycin (Vancocin) cap 125 mg       Date Action Dose Route User    6/4/2025 0950 Given 125 mg Oral Johanna Simon RN    6/3/2025 1417 Given 125 mg Oral Lacie Goldstein RN          cholecalciferol (Vitamin D3) tab 5,000 Units       Date Action Dose Route User    6/4/2025 0950 Given 5,000 Units Oral Johanna Simon RN            Vitals (last day)       Date/Time Temp Pulse Resp BP SpO2 Weight O2 Device O2 Flow Rate (L/min) [facility]    06/04/25 1229 -- -- -- -- -- -- High flow/High  humidity 30 L/min KS    06/04/25 1150 97.9 °F (36.6 °C) 70 16 127/69 95 % -- High flow/High humidity 33 L/min MARINA    06/04/25 0835 97.8 °F (36.6 °C) 71 18 134/78 94 % -- High flow/High humidity 33 L/min OP    06/04/25 0710 -- -- -- -- -- -- High flow/High humidity 33 L/min [place]    06/04/25 0427 97.9 °F (36.6 °C) 54 18 141/65 94 % -- High flow/High humidity 33 L/min EA    06/03/25 2359 98.5 °F (36.9 °C) 54 18 132/70 94 % -- High flow/High humidity 33 L/min EA    06/03/25 2018 98.6 °F (37 °C) 54 16 129/67 95 % -- High flow/High humidity 33 L/min EA    06/03/25 1545 97.7 °F (36.5 °C) 56 18 125/60 94 % -- High flow/High humidity 35 L/min MARINA    06/03/25 1200 97.7 °F (36.5 °C) 55 16 115/56 97 % -- High flow/High humidity 35 L/min MARINA    06/03/25 0819 98.1 °F (36.7 °C) 67 18 125/71 95 % -- High flow/High humidity -- EF    06/03/25 0350 98.1 °F (36.7 °C) 49 16 130/53 90 % -- High flow/High humidity 40 L/min LK

## 2025-06-04 NOTE — PLAN OF CARE
Aox4. VSS. On 30L Vapotherm 50% O2. . IS encouraged. Telemetry monitoring. SCDs on BLE. Voiding with urinal. Tolerating diet. Dressing to C/D/I. Ambulating with SB assist. Plan is to continue IV ABX and wean O2. POC reviewed with pt and safety measures in place.

## 2025-06-04 NOTE — PLAN OF CARE
A&oriented x4 forgetful at times. VSS. . Vapotherm. IS encouraged. Telemetry monitoring. SCDs. Ankle pumps encouraged. Tolerating diet. Last BM 6/3. Voiding. Denies pain. Ambulating with  x1 assist. Plan is  IV Abx, wean O2 per RT. Patient updated and in agreement with plan of care. Safety precautions in place. Instructed patient to call for assistance, call light within reach.

## 2025-06-04 NOTE — CM/SW NOTE
[facility] states pt will need TCC order for [facility]- PCP listed is cardiologist.  [facility] will follow up with pt to see if he has PCP.    Leeann Landis LCSW  /Discharge Planner

## 2025-06-04 NOTE — CM/SW NOTE
Order place to price check Eliquis.  Script was sent to pt's pharmacy at [facility].  Co-pay for Eliquis is $558.  Unsure if that is due to deductible  Cardiology aware.

## 2025-06-05 LAB
ANION GAP SERPL CALC-SCNC: 8 MMOL/L (ref 0–18)
BUN BLD-MCNC: 54 MG/DL (ref 9–23)
CALCIUM BLD-MCNC: 9.2 MG/DL (ref 8.7–10.6)
CHLORIDE SERPL-SCNC: 104 MMOL/L (ref 98–112)
CO2 SERPL-SCNC: 28 MMOL/L (ref 21–32)
CREAT BLD-MCNC: 1.52 MG/DL (ref 0.7–1.3)
EGFRCR SERPLBLD CKD-EPI 2021: 45 ML/MIN/1.73M2 (ref 60–?)
GLUCOSE BLD-MCNC: 152 MG/DL (ref 70–99)
GLUCOSE BLD-MCNC: 154 MG/DL (ref 70–99)
GLUCOSE BLD-MCNC: 173 MG/DL (ref 70–99)
GLUCOSE BLD-MCNC: 180 MG/DL (ref 70–99)
GLUCOSE BLD-MCNC: 221 MG/DL (ref 70–99)
OSMOLALITY SERPL CALC.SUM OF ELEC: 308 MOSM/KG (ref 275–295)
POTASSIUM SERPL-SCNC: 4 MMOL/L (ref 3.5–5.1)
SODIUM SERPL-SCNC: 140 MMOL/L (ref 136–145)

## 2025-06-05 PROCEDURE — 99233 SBSQ HOSP IP/OBS HIGH 50: CPT | Performed by: INTERNAL MEDICINE

## 2025-06-05 PROCEDURE — 99232 SBSQ HOSP IP/OBS MODERATE 35: CPT | Performed by: INTERNAL MEDICINE

## 2025-06-05 NOTE — PROGRESS NOTES
[facility] Progress Note     SUBJECTIVE/Interval history:  No acute events overnight, he feels better, less cough and dyspnea. No pain. Remains on vapotherm    Review of Systems:   A comprehensive 14 point review of systems was completed.   Pertinent positives and negatives noted in the HPI.    Medications  Reviewed personally  Scheduled Medications[1]  PRN Medications[2]    OBJECTIVE:  Vitals:    06/05/25 0707 06/05/25 0800 06/05/25 0845 06/05/25 1115   BP:   129/72 115/69   BP Location:   Left arm Left arm   Pulse:   72 80   Resp:   18 16   Temp:   97.6 °F (36.4 °C) 97.6 °F (36.4 °C)   TempSrc:   Oral Oral   SpO2: 91% 91% 92% 96%   Weight:           Vital signs in last 24 hours:  Blood pressure 115/69, pulse 80, temperature 97.6 °F (36.4 °C), temperature source Oral, resp. rate 16, weight 255 lb (115.7 kg), SpO2 96%.     Intake/Output:  I/O last 3 completed shifts:  In: 240 [P.O.:240]  Out: 1100 [Urine:1100]  FiO2 (%):  [40 %-45 %] 45 %    Physical Exam:  General: Appears alert, oriented x3. No acute distress  Lungs:CTAB with crackles posteriorly  Heart:RRR nom  Abdomen: soft, nondistended, no rigidity, no reaction with palpation. No hernias noted  Extremities:No overt deformities, edema    Lab Data Review:   Recent Labs   Lab 06/03/25  0424 06/04/25  0436 06/05/25  0456   *  226* 213* 152*   BUN 55*  55* 60* 54*   CREATSERUM 2.15*  2.15* 1.97* 1.52*   CA 8.8  8.8 9.2 9.2   *  135* 139 140   K 4.0  4.0 3.5 4.0   CL 99  99 101 104   CO2 24.0  24.0 25.0 28.0     Recent Labs   Lab 06/01/25  2112 06/03/25  0424   RBC 3.88 3.84   HGB 11.8* 11.4*   HCT 35.0* 34.9*   MCV 90.2 90.9   MCH 30.4 29.7   MCHC 33.7 32.7   RDW 12.8 12.9   NEPRELIM 6.59 5.47   WBC 7.4 5.9   .0 242.0     No results for input(s): \"BNP\" in the last 168 hours.  No results for input(s): \"TROP\", \"CK\" in the last 168 hours.  No results for input(s): \"PT\", \"INR\", \"PTT\" in the last 168 hours.  No  results for input(s): \"ABGPHT\", \"CKHUWJ4R\", \"BCWOF3M\", \"ABGHCO3\", \"ABGBE\", \"TEMP\", \"CARMELINA\", \"SITE\", \"DEV\", \"THGB\" in the last 168 hours.    Invalid input(s): \"IKD96BPL\", \"CHOB\"    Other Labs:  Interval Culture Data:   Hospital Encounter on 06/01/25   1. Blood Culture     Status: None (Preliminary result)    Collection Time: 06/01/25  9:47 PM    Specimen: Blood,peripheral   Result Value Ref Range    Blood Culture Result No Growth 3 Days N/A     Recent Labs   Lab 06/01/25  2335   COLORUR Yellow   CLARITY Turbid*   SPECGRAVITY 1.020   GLUUR Normal   BILUR Negative   KETUR Negative   BLOODURINE 2+*   PHURINE 5.5   PROUR 100*   UROBILINOGEN 2*   NITRITE Negative   LEUUR Negative   WBCUR 6-10*   RBCUR 3-5*   BACUR None Seen   EPIUR None Seen       Interval Radiology:   Reviewed personally  XR CHEST AP PORTABLE  (CPT=71045)  Result Date: 6/2/2025  CONCLUSION:  1. No significant change when compared to 6/1/2025 chest radiograph.   LOCATION:  [facility]      Dictated by (CST): Melissa Armstrong MD on 6/02/2025 at 8:50 AM     Finalized by (CST): Melissa Armstrong MD on 6/02/2025 at 8:53 AM       CT ABDOMEN+PELVIS(CPT=74176)  Result Date: 6/1/2025  CONCLUSION:  Left pleural effusion with atelectasis/infiltrate in the left lung base.  Pneumonia should be excluded.  Colonic diverticulosis.  Mild wall thickening of the urinary bladder.  Recommend clinical correlation to exclude cystitis.  Prostatic hypertrophy.  Bilateral fat containing inguinal hernias.  LOCATION:  [facility]   Dictated by (CST): Brenda Fleming MD on 6/01/2025 at 11:02 PM     Finalized by (CST): Brenda Fleming MD on 6/01/2025 at 11:05 PM       XR CHEST AP PORTABLE  (CPT=71045)  Result Date: 6/1/2025  CONCLUSION:  Patchy airspace disease in the left upper lobe concerning for pneumonia.  Stable heart size and pulmonary vascularity.   LOCATION:  [facility]      Dictated by (CST): Brenda Fleming MD on 6/01/2025 at 9:21 PM     Finalized by (CST): Brenda Fleming MD on 6/01/2025 at 9:21 PM          ASSESSMENT  Acute hypoxic respiratory failure due to legionnaires  Legionnaires disease  Sepsis due to pneumonia  CAD, elevated troponin/ NSTEMI  CDIFF  Elevated LFTs  Acute/chronic renal failure  HTN     PLAN  Continue close monitoring of respiratory status, wean o2 for sats >89%  Continue azithromycin for pneumonia for 10-14 days (presently day 5)  Continue scheduled nebulized bronchodilators  Continue adjunctive steroids for pneumonia       [1]    insulin degludec  12 Units Subcutaneous Nightly    apixaban  2.5 mg Oral BID    azithromycin  500 mg Intravenous Q24H    vancomycin  125 mg Oral Daily    insulin aspart  2-10 Units Subcutaneous TID AC and HS    methylPREDNISolone  40 mg Intravenous Q12H    insulin NPH-human isophane  14 Units Subcutaneous Q12H    insulin aspart  1-68 Units Subcutaneous TID CC    guaiFENesin ER  600 mg Oral BID    aspirin  81 mg Oral Daily    atorvastatin  20 mg Oral Nightly    carvedilol  12.5 mg Oral BID with meals    cholecalciferol  5,000 Units Oral Daily    ezetimibe  10 mg Oral Nightly    fenofibrate micronized  134 mg Oral Daily with breakfast    ipratropium-albuterol  3 mL Nebulization Q6H WA    levothyroxine  88 mcg Oral Before breakfast   [2]   glucose **OR** glucose **OR** glucose-vitamin C **OR** dextrose **OR** glucose **OR** glucose **OR** glucose-vitamin C    acetaminophen    melatonin    polyethylene glycol (PEG 3350)    sennosides    bisacodyl    fleet enema    ondansetron    glycerin-hypromellose-    sodium chloride    albuterol    metoclopramide

## 2025-06-05 NOTE — DISCHARGE INSTRUCTIONS
Sometimes managing your health at home requires assistance.  The [facility] team has recognized your preference to use [facility].  They can be reached by phone at [phone].  The fax number for your reference is [phone].  A representative from the home health agency will contact you or your family to schedule your first visit.   What Is Pneumonia?  Pneumonia is a serious lung infection. It can affect 1 or both lungs. Many cases of pneumonia are caused by bacteria or viruses. Fungi may also cause pneumonia. But this is less common.   You may get pneumonia after an illness, such as a cold, flu, or bronchitis. Those most at risk for pneumonia include:   Babies  Children  Older adults  Smokers  People with long-term (chronic) health problems or weak immune systems    Healthy lungs  Air travels in and out of the lungs through tubes called airways.  The tubes branch into smaller passages called bronchioles. These end in tiny air sacs called alveoli.  Blood vessels surrounding the alveoli take oxygen into the bloodstream. At the same time, the alveoli remove carbon dioxide (a waste gas) from the blood. The carbon dioxide is then exhaled.    When you have pneumonia  Pneumonia causes the bronchioles and the alveoli to fill with excess mucus or pus. They become inflamed.  Your body’s response may be to cough. This can help clear out the fluid.  The fluid (mucus) you cough up may look green or dark yellow.  The excess mucus may make you feel short of breath.  The inflammation and infection may give you a fever.    What are the symptoms?  Pneumonia symptoms can come without warning. At first, you may think you have a cold or flu. But symptoms may get worse quickly. They then turn into pneumonia. Symptoms can be different for bacterial and viral pneumonia. They may be mild or severe. Common symptoms may include:   Severe cough with green or yellow mucus that doesn't get better. Or gets  worse.  Fever and chills  Upset stomach (nausea), vomiting, or diarrhea  Loss of appetite  Shortness of breath with normal daily activities  Increased heart rate  Chest pain or discomfort when breathing in or coughing  Headache  A lot of sweating and clammy skin  Severe tiredness (fatigue)  Erna last reviewed this educational content on 4/1/2024  This information is for informational purposes only. This is not intended to be a substitute for professional medical advice, diagnosis, or treatment. Always seek the advice and follow the directions from your physician or other qualified health care provider.  © 6704-5749 [employer]. All rights reserved. This information is not intended as a substitute for professional medical care. Always follow your healthcare professional's instructions.      Understanding Legionella  Legionella is a type of bacteria. It can live in fresh water. It can cause Legionnaires disease. This is a severe type of lung infection (pneumonia). This illness needs to be treated right away with antibiotic medicine. Legionella can also cause a milder illness called Pontiac fever.  How does illness from Legionella spread?  Legionella doesn’t spread from person to person. Instead, it can cause outbreaks of illness in places that have large water systems. It can overgrow where fresh water is sent through pipes and devices. This can happen if the water is not managed well. If the infected water is sprayed and a person breathes in water droplets, it can make them sick.   Legionella can grow in central water systems in places like these:  Hotels  Cruise ships  Hospitals  Large apartment buildings  Office buildings  Schools  Industrial facilities  It can spread through spray and tiny water droplets from:  Showers  Fountains  Hot tubs  Humidifiers  Air conditioning cooling towers  Ice machines  Most healthy people don’t get sick after contact with Legionella. But some factors can increase  your risk of getting sick. These include:   Being age 50 or older  Smoking  Having a chronic obstructive pulmonary disease (COPD)  Having a weak immune system due to cancer, diabetes, or kidney failure  Taking medicine that weakens your immune system  Symptoms of illness from Legionella  Symptoms can start 2 to 14 days after contact with the bacteria.  Symptoms of Legionnaires disease can include:  Fever  Tiredness  Cough  Shortness of breath  Headache  Body aches  Upset stomach  Diarrhea  Pontiac fever doesn’t cause a lung infection. The symptoms include:  Fever  Headache  Body aches  Upset stomach  Diarrhea  Diagnosing illness from Legionella  Your healthcare provider will give you a physical exam. They will ask about your symptoms. They may ask if you’ve recently been in a place where Legionnaires disease is more of a risk, such as a hospital or hot tub.   You’ll have an imaging test of your lungs. This may be a chest X-ray or CT scan. You may need to cough up a sample of mucus from your lungs (phlegm). The sample is tested in a lab for bacteria. Or your urine may be tested for bacteria.   If you have Legionnaires disease, this is reported to the local health department. They will work to find the cause so that other people don’t get sick.   Treatment for illness from Legionella  Legionnaires disease is treated with antibiotic medicine. Most people who get sick need care in the hospital but make a full recovery. You may need to be treated in a hospital with IV medicine.   Pontiac fever most often goes away on its own.  Possible complications of Legionnaires disease  Legionnaires disease can make you feel tired for months after you recover from it. Severe illness can cause breathing failure or organ failure. In some people, severe illness can lead to death.   Preventing illness from Legionella  There is no vaccine to protect against Legionella bacteria. Legionella growth in buildings can be prevented by good  management of the water systems and devices that use water.   When to call your healthcare provider  If you’ve been exposed to Legionella, call your healthcare provider right away if you have any of these:   Fever of 100.4°F (38°C) or higher, or as directed by your provider  Chills  Cough or shortness of breath  Muscle aches  Headache  Confusion  Erna last reviewed this educational content on 7/1/2023  This information is for informational purposes only. This is not intended to be a substitute for professional medical advice, diagnosis, or treatment. Always seek the advice and follow the directions from your physician or other qualified health care provider.  © 7267-8604 [employer]. All rights reserved. This information is not intended as a substitute for professional medical care. Always follow your healthcare professional's instructions.

## 2025-06-05 NOTE — PLAN OF CARE
Pt A&Ox4, on 30L Vapotherm 45%. VSS. Telemetry monitoring. Up with SBA and walker. Voiding via urinal. Denies pain. Plan: IV antibiotic, wean O2. POC discussed w/ pt, all questions answered. Call light within reach, safety parameters in place.

## 2025-06-05 NOTE — OCCUPATIONAL THERAPY NOTE
OCCUPATIONAL THERAPY TREATMENT NOTE - INPATIENT     Room Number: 369/369-A  Session: 1   Number of Visits to Meet Established Goals: 3    Presenting Problem: Community acquired pnuemonia of L upper lobe of lung    HOME SITUATION  Type of Home: House  Home Layout: Two level  Lives With: Spouse     Toilet and Equipment: Comfort height toilet  Shower/Tub and Equipment: Walk-in shower, Grab bar  Other Equipment: Long-handled shoehorn (RW and cane)     Drives: Yes  Patient Regularly Uses: Glasses     Prior Level of Function: IND with ADLs/IADLs, lives at home with supportive spouse.    ASSESSMENT   Patient demonstrates excellent progress this session, goals remain in progress.    Patient continues to function near baseline with ADLs and functional transfers.   Contributing factors to remaining limitations include decreased functional strength, decreased muscular endurance, and increased O2 needs from baseline. Occupational Therapy will continue to follow patient for duration of hospitalization.    Patient continues to benefit from continued skilled OT services: for duration of hospitalization, however, given the patient is functioning near baseline level do not anticipate skilled therapy needs at discharge .     History: Patient is a 84 year old male admitted on 6/1/2025 with Presenting Problem: Community acquired pnuemonia of L upper lobe of lung. Co-Morbidities : HLD/ HTN/ CAD/ SVT/ hypothyroidism    WEIGHT BEARING RESTRICTION       Recommendations for nursing staff:   Transfers: 1 person  Toileting location: bedside commode    TREATMENT SESSION:  Patient Start of Session: semi supine in bed    FUNCTIONAL TRANSFER ASSESSMENT  Sit to Stand: Edge of Bed; Chair  Edge of Bed: Supervision  Chair: Supervision    BED MOBILITY  Supine to Sit : Supervision  Sit to Supine (OT): Not Tested  Scooting: Supervision    BALANCE ASSESSMENT  Static Sitting: Supervision  Static Standing: Supervision    FUNCTIONAL ADL ASSESSMENT  LB  Dressing Seated: Supervision    ACTIVITY TOLERANCE: WFL; 30L 40% vapo; good activity tolerance with transfers and exercises                         O2 SATURATIONS       EDUCATION PROVIDED  Patient Education : Role of Occupational Therapy; Plan of Care; Discharge Recommendations; Functional Transfer Techniques; Fall Prevention; Posture/Positioning; Energy Conservation; Proper Body Mechanics  Patient's Response to Education: Verbalized Understanding; Returned Demonstration    Equipment used: RW  Demonstrates functional use    THERAPEUTIC EXERCISES  Lower Extremity      Upper Extremity Hand pumps  Elbow flex/extend  Forward punch  Shoulder raise  Scap squeeze     Position Sitting in chair     Repetitions 10   Sets 1     Therapist comments: Pt received semi supine in bed, pleasant and cooperative for OT session. Pt agreeable for OOB activity in preparation for ADLs and functional transfers. Pt demos bed mobility at supervision and transfers to bedside chair with RW requiring supervision. Pt then engages in UE exercises to increase strength, ROM, and activity tolerance required for ADLs and functional transfers.     Patient End of Session: Up in chair, Needs met, Call light within reach, RN aware of session/findings, All patient questions and concerns addressed, Hospital anti-slip socks, Alarm set    SUBJECTIVE  \"You guys make a good team.\"    PAIN ASSESSMENT  Ratin  Location: denies  Management Techniques: Repositioning, Body mechanics, Activity promotion     OBJECTIVE  Precautions: Bed/chair alarm (Vapotherm 40L)    AM-PAC ‘6-Clicks’ Inpatient Daily Activity Short Form  -   Putting on and taking off regular lower body clothing?: A Little  -   Bathing (including washing, rinsing, drying)?: A Little  -   Toileting, which includes using toilet, bedpan or urinal? : A Little  -   Putting on and taking off regular upper body clothing?: None  -   Taking care of personal grooming such as brushing teeth?: None  -   Eating  meals?: None    AM-PAC Score:  Score: 21  Approx Degree of Impairment: 32.79%  Standardized Score (AM-PAC Scale): 44.27    PLAN  OT Device Recommendations: TBD  OT Treatment Plan: Balance activities, Energy conservation/work simplification techniques, ADL training, IADL training, Functional transfer training, UE strengthening/ROM, Endurance training, Patient/Family education, Patient/Family training, Equipment eval/education, Compensatory technique education, Continued evaluation  Rehab Potential : Good  Frequency: 3-5x/week    OT Goals:     All goals ongoing 06/05    ADL Goals   Patient will perform lower body dressing:  with modified independent  Patient will perform toileting: with modified independent     Functional Transfer Goals  Patient will transfer from supine to sit:  with supervision  Patient will transfer from sit to stand:  with supervision  Patient will transfer to toilet:  with supervision     UE Exercise Program Goal  Patient will be modified independent with bilateral AROM HEP (home exercise program).     Additional Goals  Pt will state 2 energy conservation techniques and utilize during ADL.    OT Session Time: 25 minutes  Therapeutic Activity: 15 minutes  Therapeutic Exercise: 10 minutes

## 2025-06-05 NOTE — PHYSICAL THERAPY NOTE
PHYSICAL THERAPY TREATMENT NOTE - INPATIENT    Room Number: 369/369-A     Session: 1     Number of Visits to Meet Established Goals: 3    Presenting Problem: Community Acquired pneumonia of L upper lobe of lung  Co-Morbidities : HLD/ HTN/ CAD/ SVT/ hypothyroidism    PHYSICAL THERAPY ASSESSMENT   Patient demonstrates good  progress this session, goals  remain in progress.      Patient is requiring supervision and contact guard assist as a result of the following impairments: decreased endurance/aerobic capacity and increased O2 needs from baseline.     Patient continues to function below baseline with transfers, gait, and standing prolonged periods.  Next session anticipate patient to progress gait.  Physical Therapy will continue to follow patient for duration of hospitalization.    Patient continues to benefit from continued skilled PT services: at discharge to promote prior level of function.  Anticipate patient will return home with home health PT.    PLAN DURING HOSPITALIZATION  Nursing Mobility Recommendation : 1 Assist  PT Device Recommendation: Rolling walker  PT Treatment Plan: Bed mobility, Body mechanics, Gait training, Strengthening, Stair training, Transfer training, Balance training  Frequency (Obs): 3-5x/week     CURRENT GOALS       Goal #1 Patient is able to demonstrate supine - sit EOB @ level: supervision      Goal #2 Patient is able to demonstrate transfers EOB to/from C at assistance level: supervision      Goal #3 Patient is able to ambulate 150 feet with assist device: walker - rolling at assistance level: supervision      Goal #4 Pt is able to perform marching in place for 1 mins with CGA with the RW.    Goal #5 Pt is able to perform chair    Goal #6     Goal Comments: Goals established on 6/3/2025  6/5/2025 all goals ongoing     SUBJECTIVE  \"I like to stay active. I like walking outside\"     PHYSICAL THERAPY MEDICAL/SOCIAL HISTORY  History related to current admission: Patient is a 84 year  old male admitted on 2025 from Home for Community Acquired pneumonia of L upper lobe of lung.       HOME SITUATION  Type of Home: House  Home Layout: Two level           Stairs to Bedroom: 12    Railing: Yes    Lives With: Spouse    Drives: Yes   Patient Regularly Uses: Glasses       Prior Level of [place]: Pt states that he lives in a house with a spouse. Pt reports that he is IND with all his ADLs. Pt does not typically use a RW to ambulate.      OBJECTIVE  Precautions: Bed/chair alarm (Vapotherm 40L)    WEIGHT BEARING RESTRICTION     PAIN ASSESSMENT   Ratin  Location: pt denies  Management Techniques: Activity promotion    BALANCE                                                                                                                       Static Sitting: Fair +  Dynamic Sitting: Fair +           Static Standing: Fair -  Dynamic Standing: Fair -    ACTIVITY TOLERANCE                         O2 WALK       AM-PAC '6-Clicks' INPATIENT SHORT FORM - BASIC MOBILITY  How much difficulty does the patient currently have...  Patient Difficulty: Turning over in bed (including adjusting bedclothes, sheets and blankets)?: None   Patient Difficulty: Sitting down on and standing up from a chair with arms (e.g., wheelchair, bedside commode, etc.): A Little   Patient Difficulty: Moving from lying on back to sitting on the side of the bed?: A Little   How much help from another person does the patient currently need...   Help from Another: Moving to and from a bed to a chair (including a wheelchair)?: A Little   Help from Another: Need to walk in hospital room?: A Little   Help from Another: Climbing 3-5 steps with a railing?: A Little     AM-PAC Score:  Raw Score: 19   Approx Degree of Impairment: 41.77%   Standardized Score (AM-PAC Scale): 45.44   CMS Modifier (G-Code): CK    FUNCTIONAL ABILITY STATUS  Gait Assessment   Functional Mobility/Gait Assessment  Gait Assistance: Contact guard assist  Distance (ft):  4  Assistive Device: Rolling walker  Pattern: Shuffle  Stairs: Stairs    Skilled Therapy Provided  Pt presents in bed   Therapist educated pt on EC, activity recs, safe mobility- and breathing techniques  Pt on VT , limited by lines  Pt t/f bed>chair c RW supervision  Therapist cued pt for BUE BLE therex c seated rest breaks   Therapist educated pt on PLB with good return demo  O2 sats monitored throughout     Bed Mobility:  Rolling: nt     Supine<>Sit: supervision HOB elevated    Sit<>Supine: nt     Transfer Mobility:  Sit<>Stand: supervision    Stand<>Sit: supervision    Gait: CGA c RW     Therapist's Comments: pt on 30L 45% VT  Sats 89% with therex, recovers quickly to 91-92%    MODIFIED CATIE DYSPNEA SCALE - (SHORTNESS OF BREATH SCALE)    SCORE DESCRIPTION   0 Nothing at all   1 Very slight   2 Slight   3 Moderate   4 Somewhat severe   5 Strong or hard breathing   6    7 Very hard breathing   8    9 Very, Very Severe   10 MAX - You need to stop     Patient Education provided:    Use this scale to rate the difficulty of your breathing:  - As you would rate your pain from 0-10, you can rate your SOB from 0-10  - Goal is to stay below 7/10 with activity  - If you reach beyond 7/10, it is important to rest; stop activity and if you need to sit down to recover  Pt reports 0/10 RPE         THERAPEUTIC EXERCISES  Lower Extremity Alternating marching  Ankle pumps  Hip adduction squeezes  Knee extension  LAQ     Upper Extremity Scapular Retraction and diaphragmatic breathing , PLB      Position Sitting     Repetitions   10   Sets   1     Patient End of Session: Up in chair, Needs met, Call light within reach, RN aware of session/findings, All patient questions and concerns addressed, Hospital anti-slip socks, Alarm set    PT Session Time: 25 minutes  Gait Trainin minutes  Therapeutic Activity: 10 minutes  Therapeutic Exercise: 10 minutes   Neuromuscular Re-education:  minutes   Detail Level: Detailed Detail Level: Simple Detail Level: Zone

## 2025-06-05 NOTE — HOME CARE LIAISON
Spoke with patient at the bedside, discussed home health care benefits. [facility] [place] does not [facility] the name. Patient indicated that he knows they offer services as well. Wants to [facility] all the [facility] and [facility] and give to his daughter Anabella who is heavily involved in his care. He indicated he wants to do his research before he commits to anything. Writer expressed [facility] would need to have an MD in the community to sign for home health care. Offered MD at home to patient and he was not interested .  Patient indicated that if he went with [facility] he would call the the agency and give them the referral and the MD information. Informed ELOY Pabon of discussion . [facility] will remain available for any and all home health care needs

## 2025-06-05 NOTE — PLAN OF CARE
Patient alert and oriented x4. Isolation precautions. VSS on Vapotherm 30L 45% FiO2. Tele in afib, controlled rate. Denies pain. SCDs, ankle pumps encouraged, IS encouraged. Voiding in urinal. Up x1 with the walker. Tolerating diet, no c/o n/v.     Plan: IV abx, monitor O2 needs

## 2025-06-05 NOTE — PROGRESS NOTES
[facility]   part of [facility]     Hospitalist Progress Note     Steven Vargas Patient Status:  Inpatient    1940 MRN [id number]   Location [facility] 3SW-A Attending Gaetano Jaramillo MD   Hosp Day # 3 PCP Geremias Dyer MD     Chief Complaint: SOB / weakness     Subjective:     Patient on vapotherm states he feels a bit better.     Objective:    Review of Systems:   A comprehensive review of systems was completed; pertinent positive and negatives stated in subjective.    Vital signs:  Temp:  [97.6 °F (36.4 °C)-98.2 °F (36.8 °C)] 97.6 °F (36.4 °C)  Pulse:  [56-74] 74  Resp:  [16-18] 16  BP: (123-154)/(67-81) 154/81  SpO2:  [90 %-95 %] 91 %  FiO2 (%):  [45 %] 45 %    Physical Exam:    General: No acute distress  Respiratory: Rhonchi to left lung, no wheeze  Cardiovascular: S1, S2, IRIR  Abdomen: Soft, Non-tender, non-distended, positive bowel sounds  Neuro: No new focal deficits.   Extremities: No edema    Diagnostic Data:    Labs:  Recent Labs   Lab 25  0424   WBC 7.4 5.9   HGB 11.8* 11.4*   MCV 90.2 90.9   .0 242.0       Recent Labs   Lab 25  0424 25  0436 25  0456   * 226*  226* 213* 152*   BUN 42* 55*  55* 60* 54*   CREATSERUM 2.43* 2.15*  2.15* 1.97* 1.52*   CA 9.4 8.8  8.8 9.2 9.2   ALB 4.9* 4.1  --   --    * 135*  135* 139 140   K 3.6 4.0  4.0 3.5 4.0   CL 95* 99  99 101 104   CO2 26.0 24.0  24.0 25.0 28.0   ALKPHO 35* 34*  --   --    AST 88* 87*  --   --    ALT 53* 66*  --   --    BILT 0.9 0.5  --   --    TP 7.9 6.9  --   --        Estimated Glomerular Filtration Rate: 45 mL/min/1.73m2 (A) (result from lab).    Recent Labs   Lab 25  0553   TROPHS 119* 97*       No results for input(s): \"PTP\", \"INR\" in the last 168 hours.               Microbiology    Hospital Encounter on 25   1. Blood Culture     Status: None (Preliminary result)    Collection Time: 25  9:47 PM     Specimen: Blood,peripheral   Result Value Ref Range    Blood Culture Result No Growth 3 Days N/A         Imaging: Reviewed in Epic.    Medications: Scheduled Medications[1]    Assessment & Plan:      #Acute hypoxic respiratory failure  #Pneumonia - LIANE  (+) legionella Ag in urine     #Sepsis due to pneumonia, sepsis resolved   Patient on Vapotherm now - wean as tolerated  Nebs, steroids  azithromycin  Pulm following    # Afib   - per cards   - rate is controlled   - Eliquis started     #CAD  ASA, statin, Coreg    #NSTEMI Type 2  -likely 2/2 sepsis , demand ischemia   -Echo reviewed   -Cards following     #MARYANN with CKD 3A  - monitor     #Diarrhea  -Improving   -On vanc prophylaxis for c diff    #DM Type 2  #Hyperglycemia 2/2 steroid  - NPH 14U BID for steroid use  -Degludec inc to 12U nightly  -Carb coverage, ISS    #Hypothyroidism  Synthroid     #HLD  Statin, fibrate     Katie Mendoza MD    Supplementary Documentation:     Quality:  DVT Mechanical Prophylaxis:   SCDs,    DVT Pharmacologic Prophylaxis   Medication    apixaban (Eliquis) tab 2.5 mg      DVT Pharmacologic prophylaxis: Aspirin 162 mg         Code Status: Not on file  Disla: No urinary catheter in place  Disla Duration (in days):   Central line:    EVELIA:     Discharge is dependent on: oxygen status   At this point Mr. Vargas is expected to be discharge to: Home    The 21st Century Cures Act makes medical notes like these available to patients in the interest of transparency. Please be advised this is a medical document. Medical documents are intended to carry relevant information, facts as evident, and the clinical opinion of the practitioner. The medical note is intended as peer to peer communication and may appear blunt or direct. It is written in medical language and may contain abbreviations or verbiage that are unfamiliar.                         [1]    insulin degludec  12 Units Subcutaneous Nightly    apixaban  2.5 mg Oral BID    azithromycin   500 mg Intravenous Q24H    vancomycin  125 mg Oral Daily    insulin aspart  2-10 Units Subcutaneous TID AC and HS    methylPREDNISolone  40 mg Intravenous Q12H    insulin NPH-human isophane  14 Units Subcutaneous Q12H    insulin aspart  1-68 Units Subcutaneous TID CC    guaiFENesin ER  600 mg Oral BID    aspirin  81 mg Oral Daily    atorvastatin  20 mg Oral Nightly    carvedilol  12.5 mg Oral BID with meals    cholecalciferol  5,000 Units Oral Daily    ezetimibe  10 mg Oral Nightly    fenofibrate micronized  134 mg Oral Daily with breakfast    ipratropium-albuterol  3 mL Nebulization Q6H WA    levothyroxine  88 mcg Oral Before breakfast

## 2025-06-06 LAB
GLUCOSE BLD-MCNC: 146 MG/DL (ref 70–99)
GLUCOSE BLD-MCNC: 188 MG/DL (ref 70–99)
GLUCOSE BLD-MCNC: 201 MG/DL (ref 70–99)
GLUCOSE BLD-MCNC: 272 MG/DL (ref 70–99)

## 2025-06-06 PROCEDURE — 99232 SBSQ HOSP IP/OBS MODERATE 35: CPT | Performed by: INTERNAL MEDICINE

## 2025-06-06 RX ORDER — FUROSEMIDE 10 MG/ML
20 INJECTION INTRAMUSCULAR; INTRAVENOUS ONCE
Status: COMPLETED | OUTPATIENT
Start: 2025-06-06 | End: 2025-06-06

## 2025-06-06 NOTE — PROGRESS NOTES
[facility]   part of [facility]     Hospitalist Progress Note     Steven Vargas Patient Status:  Inpatient    1940 MRN [id number]   Location [facility] 3SW-A Attending Gaetano Jaramillo MD   Hosp Day # 4 PCP Geremias Dyer MD     Chief Complaint: SOB / weakness     Subjective:     Patient on vapotherm, in the chair. Less cough. No CP.     Objective:    Review of Systems:   A comprehensive review of systems was completed; pertinent positive and negatives stated in subjective.    Vital signs:  Temp:  [97.5 °F (36.4 °C)-98 °F (36.7 °C)] 97.8 °F (36.6 °C)  Pulse:  [68-80] 68  Resp:  [16-18] 18  BP: (115-145)/(69-98) 142/98  SpO2:  [91 %-96 %] 94 %  FiO2 (%):  [40 %-45 %] 40 %    Physical Exam:    General: No acute distress  Respiratory: diminished BS BL  Cardiovascular: S1, S2  Abdomen: Soft, Non-tender, non-distended, positive bowel sounds  Neuro: No new focal deficits.   Extremities: No edema    Diagnostic Data:    Labs:  Recent Labs   Lab 25  0424   WBC 7.4 5.9   HGB 11.8* 11.4*   MCV 90.2 90.9   .0 242.0       Recent Labs   Lab 25  0424 25  0436 25  0456   * 226*  226* 213* 152*   BUN 42* 55*  55* 60* 54*   CREATSERUM 2.43* 2.15*  2.15* 1.97* 1.52*   CA 9.4 8.8  8.8 9.2 9.2   ALB 4.9* 4.1  --   --    * 135*  135* 139 140   K 3.6 4.0  4.0 3.5 4.0   CL 95* 99  99 101 104   CO2 26.0 24.0  24.0 25.0 28.0   ALKPHO 35* 34*  --   --    AST 88* 87*  --   --    ALT 53* 66*  --   --    BILT 0.9 0.5  --   --    TP 7.9 6.9  --   --        Estimated Glomerular Filtration Rate: 45 mL/min/1.73m2 (A) (result from lab).    Recent Labs   Lab 25  0553   TROPHS 119* 97*       No results for input(s): \"PTP\", \"INR\" in the last 168 hours.               Microbiology    Hospital Encounter on 25   1. Blood Culture     Status: None (Preliminary result)    Collection Time: 25  9:47 PM    Specimen:  Blood,peripheral   Result Value Ref Range    Blood Culture Result No Growth 4 Days N/A         Imaging: Reviewed in Epic.    Medications: Scheduled Medications[1]    Assessment & Plan:      #Acute hypoxic respiratory failure  #Pneumonia - LIANE  (+) legionella Ag in urine   - abx ongoing   - consider diuretic per course     #Sepsis due to pneumonia, sepsis resolved   Patient on Vapotherm now - wean as tolerated  Nebs, steroids  Azithromycin D6, plan for up to 14 days of Rx  Pulm following    # Afib   - per cards   - rate is controlled   - Eliquis , BB    #CAD  ASA, statin, Coreg    #NSTEMI Type 2  -likely 2/2 sepsis , demand ischemia   -Echo reviewed   -Cards following     #MARYANN with CKD 3A  - monitor     #Diarrhea  -Improving   -On vanc prophylaxis for c diff    #DM Type 2  #Hyperglycemia 2/2 steroid  - NPH 14U BID for steroid use  -Degludec  12U nightly  -Carb coverage, ISS    #Hypothyroidism  Synthroid     #HLD  Statin, fibrate     Katie Mendoza MD    Supplementary Documentation:     Quality:  DVT Mechanical Prophylaxis:   SCDs,    DVT Pharmacologic Prophylaxis   Medication    apixaban (Eliquis) tab 2.5 mg      DVT Pharmacologic prophylaxis: Aspirin 162 mg         Code Status: Not on file  Disla: No urinary catheter in place  Disla Duration (in days):   Central line:    EVELIA:     Discharge is dependent on: oxygen status   At this point Mr. Vargas is expected to be discharge to: Home    The 21st Century Cures Act makes medical notes like these available to patients in the interest of transparency. Please be advised this is a medical document. Medical documents are intended to carry relevant information, facts as evident, and the clinical opinion of the practitioner. The medical note is intended as peer to peer communication and may appear blunt or direct. It is written in medical language and may contain abbreviations or verbiage that are unfamiliar.                         [1]    insulin degludec  12 Units  Subcutaneous Nightly    apixaban  2.5 mg Oral BID    azithromycin  500 mg Intravenous Q24H    vancomycin  125 mg Oral Daily    insulin aspart  2-10 Units Subcutaneous TID AC and HS    methylPREDNISolone  40 mg Intravenous Q12H    insulin NPH-human isophane  14 Units Subcutaneous Q12H    insulin aspart  1-68 Units Subcutaneous TID CC    guaiFENesin ER  600 mg Oral BID    aspirin  81 mg Oral Daily    atorvastatin  20 mg Oral Nightly    carvedilol  12.5 mg Oral BID with meals    cholecalciferol  5,000 Units Oral Daily    ezetimibe  10 mg Oral Nightly    fenofibrate micronized  134 mg Oral Daily with breakfast    ipratropium-albuterol  3 mL Nebulization Q6H WA    levothyroxine  88 mcg Oral Before breakfast

## 2025-06-06 NOTE — PLAN OF CARE
Patient alert and oriented x4. VSS on vapotherm 20L 40 FiO2. Denies pain. Tele in afib, controlled rate. SCDs, ankle pumps encouraged, IS encouraged. Up x1 with the walker. Voiding in bathroom. Tolerating diet, no c/o n/v.     Plan: IV abx, monitor O2 needs    Update 1520: per respiratory patient on 10L O2 high flow nasal cannula

## 2025-06-06 NOTE — PLAN OF CARE
A&Ox4. VSS on 28L/40% on vapotherm. /IS. Tele, a-fib. SCDs, ankle pumps encouraged. Tolerating diet, last BM 6/5. Voiding freely. Denies pain. Up in w/ walker. IV abx, weaning off O2 per RT. Patient updated on POC, verbalized agreement. Safety precautions in place, pt instructed to call for assistance, call light within reach.

## 2025-06-06 NOTE — PHYSICAL THERAPY NOTE
PHYSICAL THERAPY TREATMENT NOTE - INPATIENT    Room Number: 369/369-A     Session: 2    Number of Visits to Meet Established Goals: 3    Presenting Problem: Community Acquired pneumonia of L upper lobe of lung  Co-Morbidities : HLD/ HTN/ CAD/ SVT/ hypothyroidism    PHYSICAL THERAPY ASSESSMENT   Patient demonstrates good  progress this session, goals  remain in progress.      Patient is requiring supervision and contact guard assist as a result of the following impairments: decreased endurance/aerobic capacity and increased O2 needs from baseline.     Patient continues to function below baseline with transfers, gait, and standing prolonged periods.  Next session anticipate patient to progress gait.  Physical Therapy will continue to follow patient for duration of hospitalization.    Patient continues to benefit from continued skilled PT services: at discharge to promote prior level of function.  Anticipate patient will return home with home health PT.    PLAN DURING HOSPITALIZATION  Nursing Mobility Recommendation : 1 Assist  PT Device Recommendation: Rolling walker  PT Treatment Plan: Bed mobility, Body mechanics, Gait training, Strengthening, Stair training, Transfer training, Balance training  Frequency (Obs): 3-5x/week     CURRENT GOALS       Goal #1 Patient is able to demonstrate supine - sit EOB @ level: supervision      Goal #2 Patient is able to demonstrate transfers EOB to/from C at assistance level: supervision      Goal #3 Patient is able to ambulate 150 feet with assist device: walker - rolling at assistance level: supervision      Goal #4 Pt is able to perform marching in place for 1 mins with CGA with the RW.    Goal #5 Pt is able to perform chair    Goal #6     Goal Comments: Goals established on 6/3/2025  6/6/2025 all goals ongoing     SUBJECTIVE  \"My brothers and I travel together once a year\"     PHYSICAL THERAPY MEDICAL/SOCIAL HISTORY  History related to current admission: Patient is a 84 year  old male admitted on 2025 from Home for Community Acquired pneumonia of L upper lobe of lung.       HOME SITUATION  Type of Home: House  Home Layout: Two level           Stairs to Bedroom: 12    Railing: Yes    Lives With: Spouse    Drives: Yes   Patient Regularly Uses: Glasses       Prior Level of [place]: Pt states that he lives in a house with a spouse. Pt reports that he is IND with all his ADLs. Pt does not typically use a RW to ambulate.      OBJECTIVE  Precautions: Bed/chair alarm (Vapotherm 40L)     WEIGHT BEARING RESTRICTION     PAIN ASSESSMENT   Ratin  Location: pt denies  Management Techniques: Activity promotion    BALANCE                                                                                                                       Static Sitting: Fair +  Dynamic Sitting: Fair +           Static Standing: Fair -  Dynamic Standing: Fair -    ACTIVITY TOLERANCE                         O2 WALK       AM-PAC '6-Clicks' INPATIENT SHORT FORM - BASIC MOBILITY  How much difficulty does the patient currently have...  Patient Difficulty: Turning over in bed (including adjusting bedclothes, sheets and blankets)?: None   Patient Difficulty: Sitting down on and standing up from a chair with arms (e.g., wheelchair, bedside commode, etc.): None   Patient Difficulty: Moving from lying on back to sitting on the side of the bed?: None   How much help from another person does the patient currently need...   Help from Another: Moving to and from a bed to a chair (including a wheelchair)?: A Little   Help from Another: Need to walk in hospital room?: A Little   Help from Another: Climbing 3-5 steps with a railing?: A Little     AM-PAC Score:  Raw Score: 21   Approx Degree of Impairment: 28.97%   Standardized Score (AM-PAC Scale): 50.25   CMS Modifier (G-Code): CJ    FUNCTIONAL ABILITY STATUS  Gait Assessment   Functional Mobility/Gait Assessment  Gait Assistance: Supervision  Distance (ft): 4  Assistive  Device: Rolling walker  Pattern: Shuffle  Stairs: Stairs    Skilled Therapy Provided  Pt presents in bed   Therapist educated pt on EC, activity recs, safe mobility- and breathing techniques  Pt on VT , limited by lines  Pt t/f bed>chair c RW supervision  Therapist cued pt for BUE BLE therex c seated rest breaks   Therapist educated pt on PLB with good return demo  O2 sats monitored throughout     Bed Mobility:  Rolling: nt     Supine<>Sit: supervision HOB elevated    Sit<>Supine: nt     Transfer Mobility:  Sit<>Stand: supervision    Stand<>Sit: supervision    Gait: supervision  c RW     Therapist's Comments: pt on 20L 40% VT  Sats 93% with therex, recovers quickly to 95%    MODIFIED CATIE DYSPNEA SCALE - (SHORTNESS OF BREATH SCALE)    SCORE DESCRIPTION   0 Nothing at all   1 Very slight   2 Slight   3 Moderate   4 Somewhat severe   5 Strong or hard breathing   6    7 Very hard breathing   8    9 Very, Very Severe   10 MAX - You need to stop     Patient Education provided:    Use this scale to rate the difficulty of your breathing:  - As you would rate your pain from 0-10, you can rate your SOB from 0-10  - Goal is to stay below 7/10 with activity  - If you reach beyond 7/10, it is important to rest; stop activity and if you need to sit down to recover  Pt reports 0/10 RPE         THERAPEUTIC EXERCISES  Lower Extremity Alternating marching  Ankle pumps  Hip adduction squeezes  Knee extension  LAQ     Upper Extremity Scapular Retraction, Shoulder abd/add, Shoulder flex/ext, and diaphragmatic breathing , PLB chair push ups      Position Sitting     Repetitions   10   Sets   1     Patient End of Session: Up in chair, Needs met, Call light within reach, RN aware of session/findings, All patient questions and concerns addressed, Hospital anti-slip socks, Alarm set    PT Session Time: 25 minutes  Gait Trainin minutes  Therapeutic Activity: 10 minutes  Therapeutic Exercise: 10 minutes   Neuromuscular Re-education:   minutes

## 2025-06-06 NOTE — OCCUPATIONAL THERAPY NOTE
OCCUPATIONAL THERAPY TREATMENT NOTE - INPATIENT     Room Number: 369/369-A  Session: 2  Number of Visits to Meet Established Goals: 3    Presenting Problem: Community acquired pnuemonia of L upper lobe of lung    HOME SITUATION  Type of Home: House  Home Layout: Two level  Lives With: Spouse     Toilet and Equipment: Comfort height toilet  Shower/Tub and Equipment: Walk-in shower, Grab bar  Other Equipment: Long-handled shoehorn (RW and cane)     Drives: Yes  Patient Regularly Uses: Glasses     Prior Level of Function: IND with ADLs/IADLs, lives at home with supportive spouse.    ASSESSMENT   Patient demonstrates excellent progress this session, goals remain in progress.    Patient continues to function near baseline with ADLs and functional transfers.   Contributing factors to remaining limitations include decreased functional strength, decreased muscular endurance, and increased O2 needs from baseline. Occupational Therapy will continue to follow patient for duration of hospitalization.    Patient continues to benefit from continued skilled OT services: for duration of hospitalization, however, given the patient is functioning near baseline level do not anticipate skilled therapy needs at discharge .     History: Patient is a 84 year old male admitted on 6/1/2025 with Presenting Problem: Community acquired pnuemonia of L upper lobe of lung. Co-Morbidities : HLD/ HTN/ CAD/ SVT/ hypothyroidism    WEIGHT BEARING RESTRICTION       Recommendations for nursing staff:   Transfers: 1 person  Toileting location: bedside commode    TREATMENT SESSION:  Patient Start of Session: seated in chair    FUNCTIONAL TRANSFER ASSESSMENT  Sit to Stand: Chair  Edge of Bed: Not Tested  Chair: Supervision  Commode Transfer: Supervision    BED MOBILITY  Supine to Sit : Not tested  Sit to Supine (OT): Not Tested  Scooting: NT    BALANCE ASSESSMENT  Static Sitting: Supervision  Static Standing: Supervision    FUNCTIONAL ADL ASSESSMENT  LB  Dressing Seated: Not Tested  Toileting Seated: Modified Independent  Toileting Standing: Stand-by Assist    ACTIVITY TOLERANCE: WFL; 20L 40% vapo; good activity tolerance with transfers and toilet task                         O2 SATURATIONS       EDUCATION PROVIDED  Patient Education : Role of Occupational Therapy; Plan of Care; Discharge Recommendations; Functional Transfer Techniques; Fall Prevention; Posture/Positioning; Energy Conservation; Proper Body Mechanics  Patient's Response to Education: Verbalized Understanding; Returned Demonstration    Equipment used: RW  Demonstrates functional use    Therapist comments: Pt received seated in bedside chair, reporting needing to have a BM. Due to short lines as pt is on vapo, pt transfers to bedside commode at SBA and completes all aspects of toileting at supervision/SBA. Pt denying SOB with task. Pt transfers back to chair with all needs.    Patient End of Session: Up in chair, Needs met, Call light within reach, RN aware of session/findings, All patient questions and concerns addressed, Hospital anti-slip socks, Alarm set    SUBJECTIVE  \"I'm feeling better each day.\"    PAIN ASSESSMENT  Ratin  Location: denies  Management Techniques: Repositioning, Body mechanics, Activity promotion     OBJECTIVE  Precautions: Bed/chair alarm (Vapotherm 40L)    AM-PAC ‘6-Clicks’ Inpatient Daily Activity Short Form  -   Putting on and taking off regular lower body clothing?: A Little  -   Bathing (including washing, rinsing, drying)?: A Little  -   Toileting, which includes using toilet, bedpan or urinal? : A Little  -   Putting on and taking off regular upper body clothing?: None  -   Taking care of personal grooming such as brushing teeth?: None  -   Eating meals?: None    AM-PAC Score:  Score: 21  Approx Degree of Impairment: 32.79%  Standardized Score (AM-PAC Scale): 44.27    PLAN  OT Device Recommendations: TBD  OT Treatment Plan: Balance activities, Energy conservation/work  simplification techniques, ADL training, IADL training, Functional transfer training, UE strengthening/ROM, Endurance training, Patient/Family education, Patient/Family training, Equipment eval/education, Compensatory technique education, Continued evaluation  Rehab Potential : Good  Frequency: 3-5x/week    OT Goals:     All goals ongoing 06/06    ADL Goals   Patient will perform lower body dressing:  with modified independent  Patient will perform toileting: with modified independent- goal met 06/06     Functional Transfer Goals  Patient will transfer from supine to sit:  with supervision  Patient will transfer from sit to stand:  with supervision  Patient will transfer to toilet:  with supervision     UE Exercise Program Goal  Patient will be modified independent with bilateral AROM HEP (home exercise program).     Additional Goals  Pt will state 2 energy conservation techniques and utilize during ADL.    OT Session Time: 15 minutes  Self Care Home Management: 15 minutes

## 2025-06-06 NOTE — DIETARY NOTE
[facility]   part of [facility]   CLINICAL NUTRITION    Steven Vargas     Admitting diagnosis:  MARYANN (acute kidney injury) [N17.9]  Acute hypoxemic respiratory failure (HCC) [J96.01]  Community acquired pneumonia of left upper lobe of lung [J18.9]    Ht:  6' 0\"  Wt: 115.7 kg (255 lb).   Body mass index is 34.58 kg/m².  IBW: 81 kg    Wt Readings from Last 6 Encounters:   06/02/25 115.7 kg (255 lb)   01/02/23 116.3 kg (256 lb 6.3 oz)   04/18/16 117.9 kg (260 lb)   06/23/15 118.3 kg (260 lb 14.4 oz)   11/21/07 111.6 kg (246 lb)   11/05/07 111.6 kg (246 lb)        Labs/Meds reviewed  -POC Glu:146-221, Glu:152, BUN:54, Cr:1.52, GFR:45  -Insulin, Solumedrol, Lasix, IV abx, Vit D    Diet:       Procedures    Regular/General diet Sodium Restriction: 3-4 GM NA; Is Patient on Accuchecks? Yes     Percent Meals Eaten (last 3 days)       Date/Time Percent Meals Eaten (%)    06/03/25 1200 100 %    06/04/25 1745 100 %    06/05/25 1010 100 %    06/06/25 0745 100 %          Pt chart reviewed d/t length of stay. Pt admitted w/ progressive weakness for the past 7 days, N/D, decreased PO intakes. +C-diff on 6/3.   Pt w/ acute hypoxic respiratory failure d/t Legionnaires. On vapotherm.    Pt stated current appetite is good. Not too thrilled w/ the cardiac diet. Feels like he cannot order enough food. Liberalized diet from Cardiac to Na++3-4 gm/day to allow for more food options. Recorded PO intakes:100% all meals.  Nursing notes reports Percent Meals Eaten (%): 100 % intake for last meal.  Pt declined need for ONS. Denied any N/V/C. Reported loose stools. Last BM:6/5. Skin intact.      No significant weight changes noted per EMR hx. Pt reported usual wt ~250 lb.    PMH:remote tobacco use, HTN, HLD, CAD, hypothyroidism.    Patient is at low nutrition risk at this time.    Please consult if patient status changes or nutrition issues arise.    Daniela Lisa MS, RD, LDN  Clinical Dietitian  Ext:[phone]

## 2025-06-06 NOTE — PROGRESS NOTES
[facility] Progress Note     SUBJECTIVE/Interval history:  No acute events overnight, he feels better today with less cough and dyspnea. No pain. Remains on vapotherm    Review of Systems:   A comprehensive 14 point review of systems was completed.   Pertinent positives and negatives noted in the HPI.    Medications  Reviewed personally  Scheduled Medications[1]  PRN Medications[2]    OBJECTIVE:  Vitals:    06/05/25 2330 06/06/25 0327 06/06/25 0720 06/06/25 0745   BP: 122/84 (!) 142/98     BP Location: Left arm Left arm     Pulse: 70 68     Resp: 18 18  18   Temp: 97.5 °F (36.4 °C) 97.8 °F (36.6 °C)  98.6 °F (37 °C)   TempSrc: Oral Oral  Oral   SpO2: 93% 94% 94%    Weight:           Vital signs in last 24 hours:  Blood pressure (!) 142/98, pulse 68, temperature 98.6 °F (37 °C), temperature source Oral, resp. rate 18, weight 255 lb (115.7 kg), SpO2 94%.     Intake/Output:  I/O last 3 completed shifts:  In: 240 [P.O.:240]  Out: 400 [Urine:400]  FiO2 (%):  [40 %-45 %] 40 %    Physical Exam:  General: Appears alert, oriented x3. No acute distress  Lungs:CTAB with crackles posteriorly  Heart:RRR nom  Abdomen: soft, nondistended, no rigidity, no reaction with palpation. No hernias noted  Extremities:No overt deformities, edema    Lab Data Review:   Recent Labs   Lab 06/03/25  0424 06/04/25  0436 06/05/25  0456   *  226* 213* 152*   BUN 55*  55* 60* 54*   CREATSERUM 2.15*  2.15* 1.97* 1.52*   CA 8.8  8.8 9.2 9.2   *  135* 139 140   K 4.0  4.0 3.5 4.0   CL 99  99 101 104   CO2 24.0  24.0 25.0 28.0     Recent Labs   Lab 06/01/25  2112 06/03/25  0424   RBC 3.88 3.84   HGB 11.8* 11.4*   HCT 35.0* 34.9*   MCV 90.2 90.9   MCH 30.4 29.7   MCHC 33.7 32.7   RDW 12.8 12.9   NEPRELIM 6.59 5.47   WBC 7.4 5.9   .0 242.0     No results for input(s): \"BNP\" in the last 168 hours.  No results for input(s): \"TROP\", \"CK\" in the last 168 hours.  No results for input(s): \"PT\", \"INR\",  \"PTT\" in the last 168 hours.  No results for input(s): \"ABGPHT\", \"EEVFEK9G\", \"KIIFH9H\", \"ABGHCO3\", \"ABGBE\", \"TEMP\", \"CARMELINA\", \"SITE\", \"DEV\", \"THGB\" in the last 168 hours.    Invalid input(s): \"APH40OOY\", \"CHOB\"    Other Labs:  Interval Culture Data:   Hospital Encounter on 06/01/25   1. Blood Culture     Status: None (Preliminary result)    Collection Time: 06/01/25  9:47 PM    Specimen: Blood,peripheral   Result Value Ref Range    Blood Culture Result No Growth 4 Days N/A     Recent Labs   Lab 06/01/25  2335   COLORUR Yellow   CLARITY Turbid*   SPECGRAVITY 1.020   GLUUR Normal   BILUR Negative   KETUR Negative   BLOODURINE 2+*   PHURINE 5.5   PROUR 100*   UROBILINOGEN 2*   NITRITE Negative   LEUUR Negative   WBCUR 6-10*   RBCUR 3-5*   BACUR None Seen   EPIUR None Seen       Interval Radiology:   Reviewed personally  XR CHEST AP PORTABLE  (CPT=71045)  Result Date: 6/2/2025  CONCLUSION:  1. No significant change when compared to 6/1/2025 chest radiograph.   LOCATION:  [facility]      Dictated by ([facility]): Melissa Armstrong MD on 6/02/2025 at 8:50 AM     Finalized by (CST): Melissa Armstrong MD on 6/02/2025 at 8:53 AM       CT ABDOMEN+PELVIS(CPT=74176)  Result Date: 6/1/2025  CONCLUSION:  Left pleural effusion with atelectasis/infiltrate in the left lung base.  Pneumonia should be excluded.  Colonic diverticulosis.  Mild wall thickening of the urinary bladder.  Recommend clinical correlation to exclude cystitis.  Prostatic hypertrophy.  Bilateral fat containing inguinal hernias.  LOCATION:  [facility]   Dictated by (CST): Brenda Fleming MD on 6/01/2025 at 11:02 PM     Finalized by (CST): Brenda Fleming MD on 6/01/2025 at 11:05 PM       XR CHEST AP PORTABLE  (CPT=71045)  Result Date: 6/1/2025  CONCLUSION:  Patchy airspace disease in the left upper lobe concerning for pneumonia.  Stable heart size and pulmonary vascularity.   LOCATION:  [facility]      Dictated by (CST): Brenda Fleming MD on 6/01/2025 at 9:21 PM     Finalized by (CST): Lonnie  MD Brenda on 6/01/2025 at 9:21 PM         ASSESSMENT  Acute hypoxic respiratory failure due to legionnaires  Legionnaires disease  Sepsis due to pneumonia  CAD, elevated troponin/ NSTEMI  CDIFF  Elevated LFTs  Acute/chronic renal failure  HTN     PLAN  Continue close monitoring of respiratory status, wean o2 for sats >89%  Continue azithromycin for pneumonia for 10-14 days (presently day 6)  Continue scheduled nebulized bronchodilators  Continue adjunctive steroids for pneumonia         [1]    insulin degludec  12 Units Subcutaneous Nightly    apixaban  2.5 mg Oral BID    azithromycin  500 mg Intravenous Q24H    vancomycin  125 mg Oral Daily    insulin aspart  2-10 Units Subcutaneous TID AC and HS    methylPREDNISolone  40 mg Intravenous Q12H    insulin NPH-human isophane  14 Units Subcutaneous Q12H    insulin aspart  1-68 Units Subcutaneous TID CC    guaiFENesin ER  600 mg Oral BID    aspirin  81 mg Oral Daily    atorvastatin  20 mg Oral Nightly    carvedilol  12.5 mg Oral BID with meals    cholecalciferol  5,000 Units Oral Daily    ezetimibe  10 mg Oral Nightly    fenofibrate micronized  134 mg Oral Daily with breakfast    ipratropium-albuterol  3 mL Nebulization Q6H WA    levothyroxine  88 mcg Oral Before breakfast   [2]   glucose **OR** glucose **OR** glucose-vitamin C **OR** dextrose **OR** glucose **OR** glucose **OR** glucose-vitamin C    acetaminophen    melatonin    polyethylene glycol (PEG 3350)    sennosides    bisacodyl    fleet enema    ondansetron    glycerin-hypromellose-    sodium chloride    albuterol    metoclopramide

## 2025-06-07 ENCOUNTER — APPOINTMENT (OUTPATIENT)
Dept: GENERAL RADIOLOGY | Facility: HOSPITAL | Age: 85
End: 2025-06-07
Attending: INTERNAL MEDICINE
Payer: MEDICARE

## 2025-06-07 LAB
GLUCOSE BLD-MCNC: 112 MG/DL (ref 70–99)
GLUCOSE BLD-MCNC: 202 MG/DL (ref 70–99)
GLUCOSE BLD-MCNC: 213 MG/DL (ref 70–99)
GLUCOSE BLD-MCNC: 280 MG/DL (ref 70–99)

## 2025-06-07 PROCEDURE — 71045 X-RAY EXAM CHEST 1 VIEW: CPT | Performed by: INTERNAL MEDICINE

## 2025-06-07 PROCEDURE — 99232 SBSQ HOSP IP/OBS MODERATE 35: CPT | Performed by: INTERNAL MEDICINE

## 2025-06-07 RX ORDER — INSULIN DEGLUDEC 100 U/ML
15 INJECTION, SOLUTION SUBCUTANEOUS NIGHTLY
Status: DISCONTINUED | OUTPATIENT
Start: 2025-06-07 | End: 2025-06-09

## 2025-06-07 RX ORDER — IPRATROPIUM BROMIDE AND ALBUTEROL SULFATE 2.5; .5 MG/3ML; MG/3ML
3 SOLUTION RESPIRATORY (INHALATION) 2 TIMES DAILY
Status: DISCONTINUED | OUTPATIENT
Start: 2025-06-07 | End: 2025-06-09

## 2025-06-07 RX ORDER — AZITHROMYCIN 250 MG/1
500 TABLET, FILM COATED ORAL DAILY
Status: DISCONTINUED | OUTPATIENT
Start: 2025-06-07 | End: 2025-06-09

## 2025-06-07 NOTE — PROGRESS NOTES
SPoke with wife, she voiced concerns that pt is not ready to DC.  She has balance issues and is not able to help pt that much at home.  She is worried about him doing stairs in particular.  Paged PT to inform to see if they can practice stairs prior to DC.  Wife also wants to discuss DC with SW.

## 2025-06-07 NOTE — PROGRESS NOTES
[facility]   part of [facility]     Hospitalist Progress Note     Steven Vargas Patient Status:  Inpatient    1940 MRN [id number]   Location [facility] 3SW-A Attending Gaetano Jaramillo MD   Hosp Day # 5 PCP Geremias Dyer MD     Chief Complaint: SOB / weakness     Subjective:     Patient on 5L O2 now. Feels restless to get home soon.wife is at bedside.     Objective:    Review of Systems:   A comprehensive review of systems was completed; pertinent positive and negatives stated in subjective.    Vital signs:  Temp:  [97.5 °F (36.4 °C)-98.6 °F (37 °C)] 97.8 °F (36.6 °C)  Pulse:  [58-79] 67  Resp:  [18-20] 18  BP: (131-160)/(72-86) 160/86  SpO2:  [89 %-100 %] 95 %  FiO2 (%):  [40 %] 40 %    Physical Exam:    General: No acute distress  Respiratory: diminished BS BL  Cardiovascular: S1, S2  Abdomen: Soft, Non-tender, non-distended, positive bowel sounds  Neuro: No new focal deficits.   Extremities: No edema    Diagnostic Data:    Labs:  Recent Labs   Lab 25  0424   WBC 7.4 5.9   HGB 11.8* 11.4*   MCV 90.2 90.9   .0 242.0       Recent Labs   Lab 25  0424 25  0436 25  0456   * 226*  226* 213* 152*   BUN 42* 55*  55* 60* 54*   CREATSERUM 2.43* 2.15*  2.15* 1.97* 1.52*   CA 9.4 8.8  8.8 9.2 9.2   ALB 4.9* 4.1  --   --    * 135*  135* 139 140   K 3.6 4.0  4.0 3.5 4.0   CL 95* 99  99 101 104   CO2 26.0 24.0  24.0 25.0 28.0   ALKPHO 35* 34*  --   --    AST 88* 87*  --   --    ALT 53* 66*  --   --    BILT 0.9 0.5  --   --    TP 7.9 6.9  --   --        Estimated Glomerular Filtration Rate: 45 mL/min/1.73m2 (A) (result from lab).    Recent Labs   Lab 25  0553   TROPHS 119* 97*       No results for input(s): \"PTP\", \"INR\" in the last 168 hours.               Microbiology    Hospital Encounter on 25   1. Blood Culture     Status: None    Collection Time: 25  9:47 PM    Specimen:  Blood,peripheral   Result Value Ref Range    Blood Culture Result No Growth 5 Days N/A         Imaging: Reviewed in Epic.    Medications: Scheduled Medications[1]    Assessment & Plan:      #Acute hypoxic respiratory failure  #Pneumonia - LIANE, Legionnaire   (+) legionella Ag in urine   - abx ongoing   - lasix IV PRN- responded well to dose yesterday   - wean off O2  - IS, OOB      #Sepsis due to pneumonia, sepsis resolved   Patient on Vapotherm now - wean as tolerated  Nebs, steroids  Azithromycin D7, plan for up to 14 days of Rx  Pulm following    # Afib   - per cards   - rate is controlled   - Eliquis , BB    #CAD  ASA, statin, Coreg    #NSTEMI Type 2  -likely 2/2 sepsis , demand ischemia   -Echo reviewed   -Cards following     #MARYANN with CKD 3A  - monitor     #Diarrhea  -Improving   -On vanc prophylaxis for c diff    #DM Type 2  #Hyperglycemia 2/2 steroid  - NPH 14U BID for steroid use  -Degludec  12U nightly  -Carb coverage, ISS    #Hypothyroidism  Synthroid     #HLD  Statin, fibrate     Katie Mendoza MD    Supplementary Documentation:     Quality:  DVT Mechanical Prophylaxis:   SCDs,    DVT Pharmacologic Prophylaxis   Medication    apixaban (Eliquis) tab 2.5 mg      DVT Pharmacologic prophylaxis: Aspirin 162 mg         Code Status: Not on file  Disla: No urinary catheter in place  Disla Duration (in days):   Central line:    EVELIA:     Discharge is dependent on: oxygen status   At this point Mr. Vargas is expected to be discharge to: Home    The 21st Century Cures Act makes medical notes like these available to patients in the interest of transparency. Please be advised this is a medical document. Medical documents are intended to carry relevant information, facts as evident, and the clinical opinion of the practitioner. The medical note is intended as peer to peer communication and may appear blunt or direct. It is written in medical language and may contain abbreviations or verbiage that are unfamiliar.                          [1]    insulin degludec  12 Units Subcutaneous Nightly    apixaban  2.5 mg Oral BID    azithromycin  500 mg Intravenous Q24H    vancomycin  125 mg Oral Daily    insulin aspart  2-10 Units Subcutaneous TID AC and HS    methylPREDNISolone  40 mg Intravenous Q12H    insulin NPH-human isophane  14 Units Subcutaneous Q12H    insulin aspart  1-68 Units Subcutaneous TID CC    guaiFENesin ER  600 mg Oral BID    aspirin  81 mg Oral Daily    atorvastatin  20 mg Oral Nightly    carvedilol  12.5 mg Oral BID with meals    cholecalciferol  5,000 Units Oral Daily    ezetimibe  10 mg Oral Nightly    fenofibrate micronized  134 mg Oral Daily with breakfast    ipratropium-albuterol  3 mL Nebulization Q6H WA    levothyroxine  88 mcg Oral Before breakfast

## 2025-06-07 NOTE — PROGRESS NOTES
[facility] Progress Note     SUBJECTIVE/Interval history:  No acute events overnight, he feels 'good' today. Less dyspnea and improving cough. No pain.   Weaned off of vapotherm yesterday    Review of Systems:   A comprehensive 14 point review of systems was completed.   Pertinent positives and negatives noted in the HPI.    Medications  Reviewed personally  Scheduled Medications[1]  PRN Medications[2]    OBJECTIVE:  Vitals:    06/07/25 0000 06/07/25 0415 06/07/25 0522 06/07/25 0805   BP: 148/82 160/86  139/74   BP Location: Right arm Right arm  Left arm   Pulse: 66 58 67 64   Resp: 18 18  18   Temp: 97.5 °F (36.4 °C) 97.8 °F (36.6 °C)  97.8 °F (36.6 °C)   TempSrc: Oral Oral  Oral   SpO2: (!) 89% 97% 95% 99%   Weight:           Vital signs in last 24 hours:  Blood pressure 139/74, pulse 64, temperature 97.8 °F (36.6 °C), temperature source Oral, resp. rate 18, weight 255 lb (115.7 kg), SpO2 99%.     Intake/Output:  I/O last 3 completed shifts:  In: 240 [P.O.:240]  Out: 3200 [Urine:3200]  FiO2 (%):  [40 %] 40 %    Physical Exam:  General: Appears alert, oriented x3. No acute distress  Lungs:CTAB with crackles posteriorly  Heart:RRR nom  Abdomen: soft, nondistended, no rigidity, no reaction with palpation. No hernias noted  Extremities:No overt deformities, edema    Lab Data Review:   Recent Labs   Lab 06/03/25  0424 06/04/25  0436 06/05/25  0456   *  226* 213* 152*   BUN 55*  55* 60* 54*   CREATSERUM 2.15*  2.15* 1.97* 1.52*   CA 8.8  8.8 9.2 9.2   *  135* 139 140   K 4.0  4.0 3.5 4.0   CL 99  99 101 104   CO2 24.0  24.0 25.0 28.0     Recent Labs   Lab 06/01/25  2112 06/03/25  0424   RBC 3.88 3.84   HGB 11.8* 11.4*   HCT 35.0* 34.9*   MCV 90.2 90.9   MCH 30.4 29.7   MCHC 33.7 32.7   RDW 12.8 12.9   NEPRELIM 6.59 5.47   WBC 7.4 5.9   .0 242.0     No results for input(s): \"BNP\" in the last 168 hours.  No results for input(s): \"TROP\", \"CK\" in the last 168  hours.  No results for input(s): \"PT\", \"INR\", \"PTT\" in the last 168 hours.  No results for input(s): \"ABGPHT\", \"HLCEWN3H\", \"TVUEI4C\", \"ABGHCO3\", \"ABGBE\", \"TEMP\", \"CARMELINA\", \"SITE\", \"DEV\", \"THGB\" in the last 168 hours.    Invalid input(s): \"WOB03HUY\", \"CHOB\"    Other Labs:  Interval Culture Data:   Hospital Encounter on 06/01/25   1. Blood Culture     Status: None    Collection Time: 06/01/25  9:47 PM    Specimen: Blood,peripheral   Result Value Ref Range    Blood Culture Result No Growth 5 Days N/A     Recent Labs   Lab 06/01/25  2335   COLORUR Yellow   CLARITY Turbid*   SPECGRAVITY 1.020   GLUUR Normal   BILUR Negative   KETUR Negative   BLOODURINE 2+*   PHURINE 5.5   PROUR 100*   UROBILINOGEN 2*   NITRITE Negative   LEUUR Negative   WBCUR 6-10*   RBCUR 3-5*   BACUR None Seen   EPIUR None Seen       Interval Radiology:   Reviewed personally  XR CHEST AP PORTABLE  (CPT=71045)  Result Date: 6/7/2025  CONCLUSION:  See above.   LOCATION:  [facility]      Dictated by (CST): Beto Solitario MD on 6/07/2025 at 7:22 AM     Finalized by (CST): Beto Solitario MD on 6/07/2025 at 7:22 AM       XR CHEST AP PORTABLE  (CPT=71045)  Result Date: 6/2/2025  CONCLUSION:  1. No significant change when compared to 6/1/2025 chest radiograph.   LOCATION:  [facility]      Dictated by (CST): Melissa Armstrong MD on 6/02/2025 at 8:50 AM     Finalized by (CST): Melissa Armstrong MD on 6/02/2025 at 8:53 AM       CT ABDOMEN+PELVIS(CPT=74176)  Result Date: 6/1/2025  CONCLUSION:  Left pleural effusion with atelectasis/infiltrate in the left lung base.  Pneumonia should be excluded.  Colonic diverticulosis.  Mild wall thickening of the urinary bladder.  Recommend clinical correlation to exclude cystitis.  Prostatic hypertrophy.  Bilateral fat containing inguinal hernias.  LOCATION:  [facility]   Dictated by (CST): Brenda Fleming MD on 6/01/2025 at 11:02 PM     Finalized by (CST): Brenda Fleming MD on 6/01/2025 at 11:05 PM       XR CHEST AP PORTABLE  (CPT=71045)  Result Date:  6/1/2025  CONCLUSION:  Patchy airspace disease in the left upper lobe concerning for pneumonia.  Stable heart size and pulmonary vascularity.   LOCATION:  [facility]      Dictated by (CST): Brenda Fleming MD on 6/01/2025 at 9:21 PM     Finalized by (CST): Brenda Fleming MD on 6/01/2025 at 9:21 PM         ASSESSMENT  Acute hypoxic respiratory failure due to legionnaires  Legionnaires disease  Sepsis due to pneumonia  CAD, elevated troponin/ NSTEMI  CDIFF  Elevated LFTs  Acute/chronic renal failure  HTN     PLAN  Continue close monitoring of respiratory status, wean o2 for sats >89%  Continue azithromycin for pneumonia for 10-14 days (presently day 7)  Continue scheduled nebulized bronchodilators  Continue adjunctive steroids for pneumonia  Advised to ambulate as able         [1]    insulin degludec  12 Units Subcutaneous Nightly    apixaban  2.5 mg Oral BID    azithromycin  500 mg Intravenous Q24H    vancomycin  125 mg Oral Daily    insulin aspart  2-10 Units Subcutaneous TID AC and HS    methylPREDNISolone  40 mg Intravenous Q12H    insulin NPH-human isophane  14 Units Subcutaneous Q12H    insulin aspart  1-68 Units Subcutaneous TID CC    guaiFENesin ER  600 mg Oral BID    aspirin  81 mg Oral Daily    atorvastatin  20 mg Oral Nightly    carvedilol  12.5 mg Oral BID with meals    cholecalciferol  5,000 Units Oral Daily    ezetimibe  10 mg Oral Nightly    fenofibrate micronized  134 mg Oral Daily with breakfast    ipratropium-albuterol  3 mL Nebulization Q6H WA    levothyroxine  88 mcg Oral Before breakfast   [2]   glucose **OR** glucose **OR** glucose-vitamin C **OR** dextrose **OR** glucose **OR** glucose **OR** glucose-vitamin C    acetaminophen    melatonin    polyethylene glycol (PEG 3350)    sennosides    bisacodyl    fleet enema    ondansetron    glycerin-hypromellose-    sodium chloride    albuterol    metoclopramide

## 2025-06-07 NOTE — PROGRESS NOTES
25 1201   Mobility   O2 walk? Yes   SPO2% on Room Air at Rest 99   SPO2% on Oxygen at Rest 99   At rest oxygen flow (liters per minute) 4   SPO2% Ambulation on Room Air 87   SPO2% Ambulation on Oxygen 94   Ambulation oxygen flow (liters per minute) 4     Took pt on walk, denies /SOB. Pt required 4L oxygen with ambulation.

## 2025-06-07 NOTE — PLAN OF CARE
A&Ox4. VSS on 8L HFNC. /IS. Tele. SCDs, ankle pumps encouraged. Tolerating diet, last BM 6/6. Voiding freely. Denies pain. Up min w/ walker. IV abx, weaning off O2. Patient updated on POC, verbalized agreement. Safety precautions in place, pt instructed to call for assistance, call light within reach.

## 2025-06-07 NOTE — PLAN OF CARE
Pt A & O x4, on 5L HFNC.  /IS.  Tele-afib.  3-4gm sodium restricted diet with accu checks ACHS.  Pt requesting regular diet, paged Dr. Mendoza, she will discuss when she rounds.  Eliquis, asa 81mg.  Last BM 6/6.  Pt up x min assist.  IV abx. Oral vanc for cdiff.

## 2025-06-08 LAB
ANION GAP SERPL CALC-SCNC: 8 MMOL/L (ref 0–18)
BUN BLD-MCNC: 39 MG/DL (ref 9–23)
CALCIUM BLD-MCNC: 9.5 MG/DL (ref 8.7–10.6)
CHLORIDE SERPL-SCNC: 103 MMOL/L (ref 98–112)
CO2 SERPL-SCNC: 30 MMOL/L (ref 21–32)
CREAT BLD-MCNC: 1.5 MG/DL (ref 0.7–1.3)
EGFRCR SERPLBLD CKD-EPI 2021: 46 ML/MIN/1.73M2 (ref 60–?)
ERYTHROCYTE [DISTWIDTH] IN BLOOD BY AUTOMATED COUNT: 13.2 %
GLUCOSE BLD-MCNC: 114 MG/DL (ref 70–99)
GLUCOSE BLD-MCNC: 188 MG/DL (ref 70–99)
GLUCOSE BLD-MCNC: 189 MG/DL (ref 70–99)
GLUCOSE BLD-MCNC: 232 MG/DL (ref 70–99)
GLUCOSE BLD-MCNC: 304 MG/DL (ref 70–99)
HCT VFR BLD AUTO: 36.9 % (ref 39–53)
HGB BLD-MCNC: 11.9 G/DL (ref 13–17.5)
MCH RBC QN AUTO: 29.8 PG (ref 26–34)
MCHC RBC AUTO-ENTMCNC: 32.2 G/DL (ref 31–37)
MCV RBC AUTO: 92.5 FL (ref 80–100)
OSMOLALITY SERPL CALC.SUM OF ELEC: 306 MOSM/KG (ref 275–295)
PLATELET # BLD AUTO: 459 10(3)UL (ref 150–450)
POTASSIUM SERPL-SCNC: 4.2 MMOL/L (ref 3.5–5.1)
RBC # BLD AUTO: 3.99 X10(6)UL (ref 3.8–5.8)
SODIUM SERPL-SCNC: 141 MMOL/L (ref 136–145)
WBC # BLD AUTO: 8.6 X10(3) UL (ref 4–11)

## 2025-06-08 PROCEDURE — 99233 SBSQ HOSP IP/OBS HIGH 50: CPT | Performed by: INTERNAL MEDICINE

## 2025-06-08 PROCEDURE — 99232 SBSQ HOSP IP/OBS MODERATE 35: CPT | Performed by: INTERNAL MEDICINE

## 2025-06-08 RX ORDER — FUROSEMIDE 10 MG/ML
20 INJECTION INTRAMUSCULAR; INTRAVENOUS ONCE
Status: COMPLETED | OUTPATIENT
Start: 2025-06-08 | End: 2025-06-08

## 2025-06-08 NOTE — PROGRESS NOTES
[facility]   part of [facility]     Hospitalist Progress Note     Steven Vargas Patient Status:  Inpatient    1940 MRN [id number]   Location [facility] 3SW-A Attending Gaetano Jaramillo MD   Hosp Day # 6 PCP Geremias Dyer MD     Chief Complaint: on room AIR today     Subjective:     Patient on RA, in the chair overall better.     Objective:    Review of Systems:   A comprehensive review of systems was completed; pertinent positive and negatives stated in subjective.    Vital signs:  Temp:  [97.5 °F (36.4 °C)-98.3 °F (36.8 °C)] 97.5 °F (36.4 °C)  Pulse:  [51-82] 79  Resp:  [16-18] 16  BP: (110-163)/(55-95) 153/95  SpO2:  [87 %-99 %] 92 %    Physical Exam:    General: No acute distress  Respiratory: diminished BS BL  Cardiovascular: S1, S2  Abdomen: Soft, Non-tender, non-distended, positive bowel sounds  Neuro: No new focal deficits.   Extremities: No edema    Diagnostic Data:    Labs:  Recent Labs   Lab 254 25  0430   WBC 7.4 5.9 8.6   HGB 11.8* 11.4* 11.9*   MCV 90.2 90.9 92.5   .0 242.0 459.0*       Recent Labs   Lab 254 256 25  0456 25  0430   * 226*  226* 213* 152* 188*   BUN 42* 55*  55* 60* 54* 39*   CREATSERUM 2.43* 2.15*  2.15* 1.97* 1.52* 1.50*   CA 9.4 8.8  8.8 9.2 9.2 9.5   ALB 4.9* 4.1  --   --   --    * 135*  135* 139 140 141   K 3.6 4.0  4.0 3.5 4.0 4.2   CL 95* 99  99 101 104 103   CO2 26.0 24.0  24.0 25.0 28.0 30.0   ALKPHO 35* 34*  --   --   --    AST 88* 87*  --   --   --    ALT 53* 66*  --   --   --    BILT 0.9 0.5  --   --   --    TP 7.9 6.9  --   --   --        Estimated Glomerular Filtration Rate: 46 mL/min/1.73m2 (A) (result from lab).    Recent Labs   Lab 25  0553   TROPHS 119* 97*       No results for input(s): \"PTP\", \"INR\" in the last 168 hours.               Microbiology    Hospital Encounter on 25   1. Blood Culture      Status: None    Collection Time: 06/01/25  9:47 PM    Specimen: Blood,peripheral   Result Value Ref Range    Blood Culture Result No Growth 5 Days N/A         Imaging: Reviewed in Epic.    Medications: Scheduled Medications[1]    Assessment & Plan:      #Acute hypoxic respiratory failure  #Pneumonia - LIANE, Legionnaire   (+) legionella Ag in urine   - abx ongoing   - lasix IV PRN- responded well to dose s  - wean off O2  - IS, OOB  - home O2 eval       #Sepsis due to pneumonia, sepsis resolved   Patient on RA  Nebs, steroids-> change to PO ?   Azithromycin D8, plan for up to 14 days of Rx  Pulm following    # Afib   - per cards   - rate is controlled   - Eliquis , BB    #CAD  ASA, statin, Coreg    #NSTEMI Type 2  -likely 2/2 sepsis , demand ischemia   -Echo reviewed   -Cards following     #MARYANN with CKD 3A  - monitor     #Diarrhea, resolved  -On vanc prophylaxis for c diff    #DM Type 2  #Hyperglycemia 2/2 steroid  - NPH 14U BID for steroid use  -Degludec  12U nightly  -Carb coverage, ISS    #Hypothyroidism  Synthroid     #HLD  Statin, fibrate     Katie Mendoza MD    Supplementary Documentation:     Quality:  DVT Mechanical Prophylaxis:   SCDs,    DVT Pharmacologic Prophylaxis   Medication    apixaban (Eliquis) tab 2.5 mg      DVT Pharmacologic prophylaxis: Aspirin 162 mg         Code Status: Not on file  Disla: No urinary catheter in place  Disla Duration (in days):   Central line:    EVELIA:     Discharge is dependent on: oxygen status   At this point Mr. Vargas is expected to be discharge to: Home    The 21st Century Cures Act makes medical notes like these available to patients in the interest of transparency. Please be advised this is a medical document. Medical documents are intended to carry relevant information, facts as evident, and the clinical opinion of the practitioner. The medical note is intended as peer to peer communication and may appear blunt or direct. It is written in medical language and may  contain abbreviations or verbiage that are unfamiliar.                         [1]    ipratropium-albuterol  3 mL Nebulization BID    azithromycin  500 mg Oral Daily    insulin degludec  15 Units Subcutaneous Nightly    apixaban  2.5 mg Oral BID    vancomycin  125 mg Oral Daily    insulin aspart  2-10 Units Subcutaneous TID AC and HS    methylPREDNISolone  40 mg Intravenous Q12H    insulin NPH-human isophane  14 Units Subcutaneous Q12H    insulin aspart  1-68 Units Subcutaneous TID CC    guaiFENesin ER  600 mg Oral BID    aspirin  81 mg Oral Daily    atorvastatin  20 mg Oral Nightly    carvedilol  12.5 mg Oral BID with meals    cholecalciferol  5,000 Units Oral Daily    ezetimibe  10 mg Oral Nightly    fenofibrate micronized  134 mg Oral Daily with breakfast    levothyroxine  88 mcg Oral Before breakfast

## 2025-06-08 NOTE — PLAN OF CARE
Pt resting comfortably in bed at this time on 4l O2 via NC satting at 93%. Pt using his flutter valve & IS at this time. Vss at this time. Voiding in urinal. Given insulin as ordered. Plan home when ready. Call light within reach, bed alarm on.

## 2025-06-08 NOTE — PROGRESS NOTES
[facility] Progress Note     SUBJECTIVE/Interval history:  No acute events overnight, he feels better today with less dyspnea and cough. No pain  On room air this am    Review of Systems:   A comprehensive 14 point review of systems was completed.   Pertinent positives and negatives noted in the HPI.    Medications  Reviewed personally  Scheduled Medications[1]  PRN Medications[2]    OBJECTIVE:  Vitals:    06/07/25 1638 06/07/25 2043 06/08/25 0050 06/08/25 0341   BP: 142/77 143/78 (!) 163/85 (!) 153/95   BP Location: Left arm Left arm Left arm Left arm   Pulse: 82 76 76 79   Resp: 18 16 18 16   Temp: 98.3 °F (36.8 °C) 97.5 °F (36.4 °C) 97.5 °F (36.4 °C) 97.5 °F (36.4 °C)   TempSrc: Oral Axillary Oral Oral   SpO2: 94% 96% 94% 92%   Weight:       Height:           Vital signs in last 24 hours:  Blood pressure (!) 153/95, pulse 79, temperature 97.5 °F (36.4 °C), temperature source Oral, resp. rate 16, height 6' 0.01\" (1.829 m), weight 255 lb (115.7 kg), SpO2 92%.     Intake/Output:  I/O last 3 completed shifts:  In: 600 [P.O.:600]  Out: 2275 [Urine:2275]       Physical Exam:  General: Appears alert, oriented x3. No acute distress  Lungs:CTAB with crackles posteriorly  Heart:RRR nom  Abdomen: soft, nondistended, no rigidity, no reaction with palpation. No hernias noted  Extremities:No overt deformities, edema    Lab Data Review:   Recent Labs   Lab 06/04/25  0436 06/05/25  0456 06/08/25  0430   * 152* 188*   BUN 60* 54* 39*   CREATSERUM 1.97* 1.52* 1.50*   CA 9.2 9.2 9.5    140 141   K 3.5 4.0 4.2    104 103   CO2 25.0 28.0 30.0     Recent Labs   Lab 06/01/25  2112 06/03/25  0424 06/08/25  0430   RBC 3.88 3.84 3.99   HGB 11.8* 11.4* 11.9*   HCT 35.0* 34.9* 36.9*   MCV 90.2 90.9 92.5   MCH 30.4 29.7 29.8   MCHC 33.7 32.7 32.2   RDW 12.8 12.9 13.2   NEPRELIM 6.59 5.47  --    WBC 7.4 5.9 8.6   .0 242.0 459.0*     No results for input(s): \"BNP\" in the last 168  hours.  No results for input(s): \"TROP\", \"CK\" in the last 168 hours.  No results for input(s): \"PT\", \"INR\", \"PTT\" in the last 168 hours.  No results for input(s): \"ABGPHT\", \"KPHLIH9M\", \"IBAFG4K\", \"ABGHCO3\", \"ABGBE\", \"TEMP\", \"CARMELINA\", \"SITE\", \"DEV\", \"THGB\" in the last 168 hours.    Invalid input(s): \"PAG20ZQB\", \"CHOB\"    Other Labs:  Interval Culture Data:   Hospital Encounter on 06/01/25   1. Blood Culture     Status: None    Collection Time: 06/01/25  9:47 PM    Specimen: Blood,peripheral   Result Value Ref Range    Blood Culture Result No Growth 5 Days N/A     Recent Labs   Lab 06/01/25  2335   COLORUR Yellow   CLARITY Turbid*   SPECGRAVITY 1.020   GLUUR Normal   BILUR Negative   KETUR Negative   BLOODURINE 2+*   PHURINE 5.5   PROUR 100*   UROBILINOGEN 2*   NITRITE Negative   LEUUR Negative   WBCUR 6-10*   RBCUR 3-5*   BACUR None Seen   EPIUR None Seen       Interval Radiology:   Reviewed personally  XR CHEST AP PORTABLE  (CPT=71045)  Result Date: 6/7/2025  CONCLUSION:  See above.   LOCATION:  [facility]      Dictated by (CST): Beto Solitario MD on 6/07/2025 at 7:22 AM     Finalized by (CST): Beto Solitario MD on 6/07/2025 at 7:22 AM       XR CHEST AP PORTABLE  (CPT=71045)  Result Date: 6/2/2025  CONCLUSION:  1. No significant change when compared to 6/1/2025 chest radiograph.   LOCATION:  [facility]      Dictated by (CST): Melissa Armstrong MD on 6/02/2025 at 8:50 AM     Finalized by (CST): Melissa Armstrong MD on 6/02/2025 at 8:53 AM       CT ABDOMEN+PELVIS(CPT=74176)  Result Date: 6/1/2025  CONCLUSION:  Left pleural effusion with atelectasis/infiltrate in the left lung base.  Pneumonia should be excluded.  Colonic diverticulosis.  Mild wall thickening of the urinary bladder.  Recommend clinical correlation to exclude cystitis.  Prostatic hypertrophy.  Bilateral fat containing inguinal hernias.  LOCATION:  [facility]   Dictated by (CST): Brenda Fleming MD on 6/01/2025 at 11:02 PM     Finalized by (CST): Brenda Fleming MD on 6/01/2025 at 11:05 PM        XR CHEST AP PORTABLE  (CPT=71045)  Result Date: 6/1/2025  CONCLUSION:  Patchy airspace disease in the left upper lobe concerning for pneumonia.  Stable heart size and pulmonary vascularity.   LOCATION:  [facility]      Dictated by (CST): Brenda Fleming MD on 6/01/2025 at 9:21 PM     Finalized by (CST): Brenda Fleming MD on 6/01/2025 at 9:21 PM         ASSESSMENT  Acute hypoxic respiratory failure due to legionnaires  Legionnaires disease  Sepsis due to pneumonia  CAD, elevated troponin/ NSTEMI  CDIFF  Elevated LFTs  Acute/chronic renal failure  HTN     PLAN  Continue close monitoring of respiratory status, wean o2 for sats >89%  O2 walk today  Continue azithromycin for pneumonia for 10-14 days (presently day 8)  Continue scheduled nebulized bronchodilators  Will stop adjunctive steroids  Advised to ambulate as able  Discharge planning         [1]    furosemide  20 mg Intravenous Once    ipratropium-albuterol  3 mL Nebulization BID    azithromycin  500 mg Oral Daily    insulin degludec  15 Units Subcutaneous Nightly    apixaban  2.5 mg Oral BID    vancomycin  125 mg Oral Daily    insulin aspart  2-10 Units Subcutaneous TID AC and HS    methylPREDNISolone  40 mg Intravenous Q12H    insulin NPH-human isophane  14 Units Subcutaneous Q12H    insulin aspart  1-68 Units Subcutaneous TID CC    guaiFENesin ER  600 mg Oral BID    aspirin  81 mg Oral Daily    atorvastatin  20 mg Oral Nightly    carvedilol  12.5 mg Oral BID with meals    cholecalciferol  5,000 Units Oral Daily    ezetimibe  10 mg Oral Nightly    fenofibrate micronized  134 mg Oral Daily with breakfast    levothyroxine  88 mcg Oral Before breakfast   [2]   glucose **OR** glucose **OR** glucose-vitamin C **OR** dextrose **OR** glucose **OR** glucose **OR** glucose-vitamin C    acetaminophen    melatonin    polyethylene glycol (PEG 3350)    sennosides    bisacodyl    fleet enema    ondansetron    glycerin-hypromellose-    sodium chloride    albuterol     metoclopramide

## 2025-06-08 NOTE — PROGRESS NOTES
06/08/25 1332   Mobility   O2 walk? Yes   SPO2% on Room Air at Rest 95   SPO2% Ambulation on Room Air 90   SPO2% Ambulation on Oxygen   (n/a)     Pt sats 95% on RA.  Pt denies SOB/SMITH.  Sats dropped to 90% when walking, no oxygen required.

## 2025-06-08 NOTE — PLAN OF CARE
Pt A & O x4, on RA now sats 93%.  Will do walking O2 walk again today.  IV solu medrol Q12h.  Tele-afib.  SCDs.  Eliquis.  Last BM 6/7.  Pt up ad fernanda.  Accu checks ACHS.  On azithromycin and oral vanco for cdiff.

## 2025-06-09 VITALS
BODY MASS INDEX: 34.54 KG/M2 | TEMPERATURE: 98 F | SYSTOLIC BLOOD PRESSURE: 148 MMHG | RESPIRATION RATE: 18 BRPM | OXYGEN SATURATION: 93 % | DIASTOLIC BLOOD PRESSURE: 95 MMHG | HEART RATE: 77 BPM | HEIGHT: 72.01 IN | WEIGHT: 255 LBS

## 2025-06-09 LAB
GLUCOSE BLD-MCNC: 79 MG/DL (ref 70–99)
GLUCOSE BLD-MCNC: 87 MG/DL (ref 70–99)

## 2025-06-09 PROCEDURE — 99239 HOSP IP/OBS DSCHRG MGMT >30: CPT | Performed by: INTERNAL MEDICINE

## 2025-06-09 PROCEDURE — 99233 SBSQ HOSP IP/OBS HIGH 50: CPT | Performed by: INTERNAL MEDICINE

## 2025-06-09 RX ORDER — AZITHROMYCIN 250 MG/1
500 TABLET, FILM COATED ORAL DAILY
Qty: 7 TABLET | Refills: 0 | Status: SHIPPED | OUTPATIENT
Start: 2025-06-10 | End: 2025-06-14

## 2025-06-09 RX ORDER — AMLODIPINE BESYLATE 10 MG/1
10 TABLET ORAL DAILY
Status: DISCONTINUED | OUTPATIENT
Start: 2025-06-09 | End: 2025-06-09

## 2025-06-09 RX ORDER — AZITHROMYCIN 250 MG/1
500 TABLET, FILM COATED ORAL DAILY
Qty: 7 TABLET | Refills: 0 | Status: SHIPPED | OUTPATIENT
Start: 2025-06-09 | End: 2025-06-09

## 2025-06-09 RX ORDER — VANCOMYCIN HYDROCHLORIDE 125 MG/1
125 CAPSULE ORAL DAILY
Qty: 7 CAPSULE | Refills: 0 | Status: SHIPPED | OUTPATIENT
Start: 2025-06-10 | End: 2025-06-17

## 2025-06-09 NOTE — DISCHARGE SUMMARY
[facility] HOSPITALIST  DISCHARGE SUMMARY     Steven Vargas Patient Status:  Inpatient    1940 MRN [id number]   Location [facility] 3SW-A Attending No att. providers found   Hosp Day # 7 PCP Geremias Dyer MD     Date of Admission: 2025  Date of Discharge:  2025     Discharge Disposition: Home or Self Care    Discharge Diagnosis:  #Acute hypoxic respiratory failure  #Pneumonia - LIANE, Legionnaire   (+) legionella Ag in urine   - abx ongoing   - lasix IV PRN- responded well to doses        #Sepsis due to pneumonia, sepsis resolved      # Afib   - per cards   - rate is controlled   - Eliquis , BB     #CAD    #NSTEMI Type 2  -likely 2/2 sepsis , demand ischemia      #MARYANN with CKD 3A     #Diarrhea, resolved     #DM Type 2    #Hypothyroidism    #HLD      History of Present Illness: Steven Vargas is a 84 year old male with PMHx HLD/ HTN/ CAD/ SVT/ hypothyroidism who presented to the hospital for weakness. He reports having progressive weakness for the past 7 days to the point he was mostly laying in  bed. He felt nauseous and had decreased PO intake. He lso noticed non-bloody diarrhea several times daily and a temp of 103 F. He denied anyu cough, dyspnea, abdominal pain.       Brief Synopsis: patient found to have legionaire PNA and treated with abx and steroid and neb and fortunately ha improved and now off O2. Plan with closes monitoring as OP.      Lace+ Score: 57  59-90 High Risk  29-58 Medium Risk  0-28   Low Risk  Patient was referred to the [facility].    TCM Follow-Up Recommendation:  LACE 29-58: Moderate Risk of readmission after discharge from the hospital.    Consultants:  Pulmonary , cardiology     Discharge Medication List:     Discharge Medications        PAUSE taking these medications        Instructions Prescription details   amLODIPine 10 MG Tabs  Wait to take this until: 2025  Until follow up with primary care doctor  Commonly known as:  Norvasc      Take 1 tablet (10 mg total) by mouth in the morning.   Refills: 0     hydrALAZINE 50 MG Tabs  Wait to take this until: June 16, 2025  Until follow up with primary care doctor   Commonly known as: Apresoline      Take 2 tablets (100 mg total) by mouth in the morning and 2 tablets (100 mg total) at noon and 2 tablets (100 mg total) before bedtime.   Refills: 0            START taking these medications        Instructions Prescription details   apixaban 2.5 MG Tabs  Commonly known as: Eliquis      Take 1 tablet (2.5 mg total) by mouth 2 (two) times daily.   Quantity: 60 tablet  Refills: 3     azithromycin 250 MG Tabs  Commonly known as: Zithromax      Take 2 tablets (500 mg total) by mouth daily for 4 days.   Stop taking on: June 14, 2025  Quantity: 7 tablet  Refills: 0     vancomycin 125 MG Caps  Commonly known as: Vancocin      Take 1 capsule (125 mg total) by mouth daily for 7 days.   Stop taking on: June 17, 2025  Quantity: 7 capsule  Refills: 0            CONTINUE taking these medications        Instructions Prescription details   aspirin 81 MG Chew      Chew 1 tablet (81 mg total) by mouth in the morning.   Refills: 0     atorvastatin 20 MG Tabs  Commonly known as: Lipitor      Take 1 tablet (20 mg total) by mouth nightly.   Refills: 0     carvedilol 12.5 MG Tabs  Commonly known as: Coreg      Take 1 tablet (12.5 mg total) by mouth 2 (two) times daily with meals.   Refills: 0     Cholecalciferol 125 MCG (5000 UT) Tabs  Commonly known as: VITAMIN D-3      Take 1 tablet (5,000 Units total) by mouth in the morning.   Refills: 0     ezetimibe 10 MG Tabs  Commonly known as: Zetia      Take 1 tablet (10 mg total) by mouth nightly.   Refills: 0     fenofibrate 145 MG Tabs  Commonly known as: Tricor      Take 1 tablet (145 mg total) by mouth in the morning.   Refills: 0     Fish Oil 1200 MG Caps      Take 1,200 mg by mouth in the morning.   Refills: 0     Flaxseed Oil 1000 MG Caps      Take 1,000 mg by mouth  in the morning.   Refills: 0     Glucosamine Chond Complex/MSM Tabs      Take 1,200 mg by mouth in the morning.   Refills: 0     Levothyroxine Sodium 88 MCG Caps      Take 88 mcg by mouth before breakfast.   Refills: 0     lisinopril-hydroCHLOROthiazide 20-25 MG Tabs  Commonly known as: Zestoretic      Take 1 tablet by mouth in the morning and 1 tablet before bedtime.   Refills: 0     metFORMIN HCl ER (OSM) 500 MG (OSM) Tb24  Commonly known as: FORTAMET      Take 1 tablet (500 mg total) by mouth daily with breakfast.   Refills: 0     Multi-Vitamin/Minerals Tabs      Take 1 tablet by mouth in the morning.   Refills: 0               Where to Get Your Medications        These medications were sent to [facility] [address] [facility] [address] [phone], [phone]  [address] [facility] [address], [facility] [address]      Phone: [phone]   apixaban 2.5 MG Tabs  azithromycin 250 MG Tabs  vancomycin 125 MG Caps         [place]: [place]    Follow-up appointment:   [place] [address] [facility] [address]  [facility] [id number] [phone]  Follow up in 1 week(s)  Office will call you for follow up appt.    [facility]  [address] [facility] [address]  [facility] [id number] [phone]  Schedule an appointment as soon as possible for a visit      Geremias Junior MD  [address]  [facility] [address]  [phone]    Schedule an appointment as soon as possible for a visit in 1 week(s)      Markel Lopes MD  [address] [facility] [address]  [facility] [address]  [phone]    Schedule an appointment as soon as possible for a visit in 2 week(s)      [facility]  [address]  [facility] [address] [phone]  Follow up  [facility].    Appointments for Next 30 Days 6/10/2025 - 7/10/2025      None            Vital signs:       Physical Exam:    General: No acute distress    Lungs: clear to auscultation  Cardiovascular: S1, S2  Abdomen: Soft      -----------------------------------------------------------------------------------------------  PATIENT DISCHARGE INSTRUCTIONS: See electronic chart    Katie Mendoza MD    Total time spent on discharge plannin minutes     The  [employer] Cures Act makes medical notes like these available to patients in the interest of transparency. Please be advised this is a medical document. Medical documents are intended to carry relevant information, facts as evident, and the clinical opinion of the practitioner. The medical note is intended as peer to peer communication and may appear blunt or direct. It is written in medical language and may contain abbreviations or verbiage that are unfamiliar.

## 2025-06-09 NOTE — PLAN OF CARE
Patient alert and oriented x4. VSS on RA. Denies pain. Declining SCDs, ankle pumps encouraged, IS encouraged, flutter valve encouraged. Pt up with standby and the walker. Voiding in urinal. Tolerating diet, no c/o n/v.     Plan: discharge planning with PO abx

## 2025-06-09 NOTE — CM/SW NOTE
Received call from pt's daughter, Alison. Discussed DC planning for anticipated discharge today. Recommended pt [facility] with [facility] in order to have [facility] services arranged. Pt will also discharge on Eliquis with coupon for free 30 days supply but will need to [facility] with [facility] for [facility]. Pt's dtr expressed concern that pt will refuse to take the Eliquis. He does have financial means to cover costs if needed, but may still refuse to take medication. No other DC needs/concerns identified at this time. / to remain available for support and/or discharge planning.     Pepper Deng, [facility]  Discharge Planner  [phone]    Addendum: Informed by RN that pt's wife has expressed concerns about possible discharge today. Attempted to reach pt's wife by phone - messages left on home and cell numbers.    Addendum: Received call from pt's wife who expressed concern that pt is not yet medically ready for discharge. She is also concerned that she cannot physically assist him at home and wants to be sure he can manage stairs. Informed pt's wife of DC order in for today. Also discussed her right under Medicaid to appeal DC.    Updated therapy and requested they work on stairs with pt today. Message sent to hospitalist to ask that she call pt's wife.

## 2025-06-09 NOTE — PLAN OF CARE
Pt A&Ox4, on 3L O2 NC, VSS. Pt adm for L lung pneumonia. Up w/ assistance. Voiding into urinal. Minimal pain reported. Plan: Blood and Sputum cx pending, wean O2, Abx. POC discussed w/ pt, all questions answered. Call light within reach, safety parameters in place.

## 2025-06-09 NOTE — PROGRESS NOTES
[facility] Pulmonary Progress Note    Steven Vargas Patient Status:  Inpatient    1940 MRN [id number]   Location [facility] 3SW-A Attending Katie Mendoza MD   Hosp Day # 7 PCP Geremias Dyer MD     Subjective:    Overnight: No acute events overnight. Afebrile. On RA. Feels much better, ready to go home.     Objective:  /81 (BP Location: Left arm)   Pulse 77   Temp 98.8 °F (37.1 °C) (Oral)   Resp 18   Ht 6' 0.01\" (1.829 m)   Wt 255 lb (115.7 kg)   SpO2 93%   BMI 34.58 kg/m²     Temp (24hrs), Av.1 °F (36.7 °C), Min:97.6 °F (36.4 °C), Max:98.8 °F (37.1 °C)      Intake/Output:    Intake/Output Summary (Last 24 hours) at 2025 1048  Last data filed at 2025 0743  Gross per 24 hour   Intake 640 ml   Output --   Net 640 ml       Physical Exam:   General: alert, cooperative, oriented.  No respiratory distress.   Head: Normocephalic, without obvious abnormality, atraumatic.   Throat: Lips, mucosa, and tongue normal.  No thrush noted.   Neck: trachea midline, no adenopathy, no thyromegaly. No JVD.   Lungs: diminished breath sounds bilaterally   Chest wall: No tenderness or deformity.   Heart: regular rate and rhythm, S1, S2 normal, no murmur, click, rub or gallop   Abdomen: soft, non-distended, no masses, no guarding, no     Rebound.   Extremity: No edema or cyanosis   Skin: No rashes or lesions.   Neurological: Alert, interactive, no focal deficits    Lab Data Review:  Recent Labs     25  0430   WBC 8.6   HGB 11.9*   .0*     Recent Labs     25  0430      K 4.2      CO2 30.0   BUN 39*   CREATSERUM 1.50*     No results for input(s): \"PTP\", \"INR\", \"PTT\" in the last 168 hours.    Cultures:   Hospital Encounter on 25   1. Blood Culture     Status: None    Collection Time: 25  9:47 PM    Specimen: Blood,peripheral   Result Value Ref Range    Blood Culture Result No Growth 5 Days N/A       Radiology:  XR CHEST AP PORTABLE  (CPT=71045)  Narrative:  PROCEDURE:  XR CHEST AP PORTABLE  (CPT=71045)     TECHNIQUE:  AP chest radiograph was obtained.     COMPARISON:  [facility] , XR, XR CHEST AP PORTABLE  (CPT=71045), 6/02/2025, 8:31 AM.     INDICATIONS:  hypoxia     PATIENT STATED HISTORY: (As transcribed by Technologist)  Patient offered no additional history at this time.             FINDINGS:  There has been interval improvement in opacity of the left upper lobe.  There remains significant left upper lobe opacity present.  No pleural effusion is seen.  Heart size is within normal limits.  Continued follow-up is recommended.                   Impression: CONCLUSION:  See above.        LOCATION:  [facility]                 Dictated by (CST): Beto Solitario MD on 6/07/2025 at 7:22 AM       Finalized by (CST): Beto Solitario MD on 6/07/2025 at 7:22 AM         Medications reviewed   ASSESSMENT  Acute hypoxic respiratory failure due to legionnaires  Legionnaires disease  Sepsis due to pneumonia  CAD, elevated troponin/ NSTEMI  CDIFF  Elevated LFTs  Acute/chronic renal failure  HTN      PLAN  Continue close monitoring of respiratory status  Continue azithromycin for pneumonia for 14 days (presently day 9)  Continue scheduled nebulized bronchodilators  Advised to ambulate as able  Okay for discharge from pulmonary standpoint    Discussed with patient, Monica Irene and Dr Crum    Is this a shared or split note between Advanced Practice Provider and Physician? Yes   ARUN York  [facility]  Office: [phone]

## 2025-06-09 NOTE — PROGRESS NOTES
NURSING DISCHARGE NOTE    Discharged Home via Wheelchair.  Accompanied by Support staff  Belongings Taken by patient/family.    Patient cleared for discharge by hospitalist, pulmonology, cardiology, and PT/OT. IV removed. Patient and patients wife educated on all AVS discharge information, all questions answered. Instructed to [facility] for [facility] for Eliquis and HHC. Instructed to make all follow up appointments. Meds to beds delievered. Patient brought down to south parking [facility] with all belongings and paperwork to be driven home by spouse.

## 2025-06-09 NOTE — PHYSICAL THERAPY NOTE
PHYSICAL THERAPY TREATMENT NOTE - INPATIENT    Room Number: 369/369-A     Session: 3     Number of Visits to Meet Established Goals: 3    Presenting Problem: Community Acquired pneumonia of L upper lobe of lung  Co-Morbidities : HLD/ HTN/ CAD/ SVT/ hypothyroidism    PHYSICAL THERAPY ASSESSMENT   Patient demonstrates good  progress this session, . PT has met all his goals and does not need continued rehab services      Patient continues to benefit from continued skilled PT services: at discharge to promote prior level of function.  Anticipate patient will return home with home health PT.    PLAN DURING HOSPITALIZATION  Nursing Mobility Recommendation : 1 Assist  PT Device Recommendation: Rolling walker  PT Treatment Plan: Bed mobility, Body mechanics, Gait training, Strengthening, Stair training, Transfer training, Balance training  Frequency (Obs): 3-5x/week     CURRENT GOALS     Goal #1 Patient is able to demonstrate supine - sit EOB @ level: supervision -MET      Goal #2 Patient is able to demonstrate transfers EOB to/from BSC at assistance level: supervision -MET      Goal #3 Patient is able to ambulate 150 feet with assist device: walker - rolling at assistance level: supervision -MET      Goal #4 Pt is able to perform marching in place for 1 mins with CGA with the RW. -MET   Goal #5    Goal #6     Goal Comments: Goals established on 6/3/2025      2025 all goals ongoing    SUBJECTIVE  \"I feel better\"    OBJECTIVE  Precautions: Bed/chair alarm    WEIGHT BEARING RESTRICTION     PAIN ASSESSMENT   Ratin  Location: Pt reports no pain  Management Techniques: Activity promotion    BALANCE                                                                                                                       Static Sitting: Fair +  Dynamic Sitting: Fair +           Static Standing: Fair -  Dynamic Standing: Fair -    ACTIVITY TOLERANCE                         O2 WALK       AM-PAC '6-Clicks' INPATIENT SHORT FORM -  BASIC MOBILITY  How much difficulty does the patient currently have...  Patient Difficulty: Turning over in bed (including adjusting bedclothes, sheets and blankets)?: None   Patient Difficulty: Sitting down on and standing up from a chair with arms (e.g., wheelchair, bedside commode, etc.): None   Patient Difficulty: Moving from lying on back to sitting on the side of the bed?: None   How much help from another person does the patient currently need...   Help from Another: Moving to and from a bed to a chair (including a wheelchair)?: None   Help from Another: Need to walk in hospital room?: None   Help from Another: Climbing 3-5 steps with a railing?: A Little     AM-PAC Score:  Raw Score: 23   Approx Degree of Impairment: 11.2%   Standardized Score (AM-PAC Scale): 56.93   CMS Modifier (G-Code): CI    FUNCTIONAL ABILITY STATUS  Gait Assessment   Functional Mobility/Gait Assessment  Gait Assistance: Modified independent  Distance (ft): 200  Assistive Device: None  Pattern: Within Functional Limits  Stairs: Stairs  How Many Stairs: 6  Device: 2 Rails  Assist: Modified independent  Pattern: Ascend and Descend    Skilled Therapy Provided    Bed Mobility:  Rolling: NT   Supine<>Sit: NT  Sit<>Supine: NT     Transfer Mobility:  Sit<>Stand: MI   Stand<>Sit: MI   Gait: MI 200ft no assisted device     Therapist's Comments: Pt was observed to complete the stairs at MI level. Pt was able to demonstrate going for the step with no assisted device at MI level. Pt reports that he has no personal concerns with his balance  or gait        Patient End of Session: Up in chair, Needs met, Call light within reach, RN aware of session/findings, All patient questions and concerns addressed, Hospital anti-slip socks, Alarm set    PT Session Time: 23 minutes  Therapeutic Activity: 15 minutes

## 2025-06-09 NOTE — OCCUPATIONAL THERAPY NOTE
OCCUPATIONAL THERAPY TREATMENT NOTE - INPATIENT     Room Number: 369/369-A  Session: 3   Number of Visits to Meet Established Goals: 3    Presenting Problem: Community acquired pnuemonia of L upper lobe of lung  HOME SITUATION  Type of Home: House  Home Layout: Two level  Lives With: Spouse     Toilet and Equipment: Comfort height toilet  Shower/Tub and Equipment: Walk-in shower, Grab bar  Other Equipment: Long-handled shoehorn (RW and cane)     Drives: Yes  Patient Regularly Uses: Glasses     Prior Level of Function: IND with ADLs/IADLs, lives at home with supportive spouse.  ASSESSMENT   Patient demonstrates excellent progress this session, goals updated to reflect patient performance.    Patient is currently functioning near baseline with toileting, lower body dressing, bed mobility, transfers, static sitting balance, dynamic sitting balance, static standing balance, dynamic standing balance, maintaining seated position, functional standing tolerance, energy conservation strategies, and aerobic capacity.  Prior to admission, patient's baseline is IND with ADLs/IADLs.  Patient met all OT goals at supervision-SBA level.  Patient reports no further questions/concerns at this time.       Patient continues to benefit from continued skilled OT services: with no additional skilled services but increased support at home.     History: Patient is a 84 year old male admitted on 6/1/2025 with Presenting Problem: Community acquired pnuemonia of L upper lobe of lung. Co-Morbidities : HLD/ HTN/ CAD/ SVT/ hypothyroidism     WEIGHT BEARING RESTRICTION       Recommendations for nursing staff:   Transfers: up x 1 assist, supervision-SBA, RW  Toileting location: Toilet    TREATMENT SESSION:  Patient Start of Session: seated upright in chair    FUNCTIONAL TRANSFER ASSESSMENT  Sit to Stand: Chair  Edge of Bed: Not Tested  Chair: Supervision  Commode Transfer: Not Tested    BED MOBILITY  Supine to Sit : Not tested  Sit to Supine (OT):  Not Tested  Scooting: NT    BALANCE ASSESSMENT  Static Sitting: Supervision  Static Standing: Stand-by Assist    FUNCTIONAL ADL ASSESSMENT  LB Dressing Seated: Supervision (socks)  Toileting Seated: Not Tested  Toileting Standing: Not Tested    ACTIVITY TOLERANCE: WFL                         O2 SATURATIONS       EDUCATION PROVIDED  Patient Education : Role of Occupational Therapy; Plan of Care; Functional Transfer Techniques; Discharge Recommendations; Fall Prevention; Posture/Positioning; Proper Body Mechanics; Energy Conservation  Patient's Response to Education: Verbalized Understanding; Returned Demonstration    Equipment used: RW  Demonstrates functional use    UPPER EXTREMITY  ROM: within functional limits   Strength: is within functional limits       Therapist comments: RN cleared pt for session, received sitting upright in chair. Pt completed dynamic standing activity ~ 5 min with use of RW and SBA in preparation for standing ADLs/household distances, tolerated activity well with good understanding of energy conservation. Pt declining use of bathroom this date, reports he has been up to the toilet with no concerns. Pt educated on use of RW once home for additional support/stability as needed during recovery, pt verbalized understanding and in agreement. Pt reports no other concerns with ADLs at this time.  Patient End of Session: Up in chair, Needs met, Call light within reach, RN aware of session/findings, All patient questions and concerns addressed, Hospital anti-slip socks    SUBJECTIVE  \"I'm fine\"    PAIN ASSESSMENT  Ratin  Location: denies this date  Management Techniques: Repositioning, Body mechanics, Activity promotion     OBJECTIVE  Precautions: Bed/chair alarm    AM-PAC ‘6-Clicks’ Inpatient Daily Activity Short Form  -   Putting on and taking off regular lower body clothing?: None  -   Bathing (including washing, rinsing, drying)?: None  -   Toileting, which includes using toilet, bedpan or  urinal? : None  -   Putting on and taking off regular upper body clothing?: None  -   Taking care of personal grooming such as brushing teeth?: None  -   Eating meals?: None    AM-PAC Score:  Score: 24  Approx Degree of Impairment: 0%  Standardized Score (AM-PAC Scale): 57.54    PLAN  OT Device Recommendations: TBD  OT Treatment Plan: Balance activities, Energy conservation/work simplification techniques, ADL training, IADL training, Functional transfer training, UE strengthening/ROM, Endurance training, Patient/Family education, Patient/Family training, Equipment eval/education, Compensatory technique education, Continued evaluation  Rehab Potential : Good  Frequency: 3-5x/week    OT Goals: ADL Goals   Patient will perform lower body dressing:  with modified independent-met 6/9  Patient will perform toileting: with modified independent- goal met 06/06     Functional Transfer Goals  Patient will transfer from supine to sit:  with supervision (met 6/9-pt states no functional concerns with bed mobility)  Patient will transfer from sit to stand:  with supervision (met 6/9-pt states no functional concerns with bed mobility)  Patient will transfer to toilet:  with supervision-met 6/9     UE Exercise Program Goal  Patient will be modified independent with bilateral AROM HEP (home exercise program). Met 6/9, demonstrated sufficient UE strength during standing activity     Additional Goals  Pt will state 2 energy conservation techniques and utilize during ADL. Met 6/9    Plan: Pt will be discharged from acute inpatient OT services at this time. If a new functional limitation presents this re-admission, please re-order.    OT Session Time: 10 minutes  Therapeutic Activity: 10 minutes

## 2025-06-10 ENCOUNTER — PATIENT OUTREACH (OUTPATIENT)
Dept: CASE MANAGEMENT | Age: 85
End: 2025-06-10

## 2025-06-10 NOTE — PAYOR COMM NOTE
--------------  DISCHARGE REVIEW    Payor: [employer]  Subscriber #:  [id number]  Authorization Number: [id number]    Admit date: 6/2/25  Admit time:  12:44 AM  Discharge Date: 6/9/2025  3:00 PM     Admitting Physician: Fredrick Garcia MD  Attending Physician:  [place] att. providers found  Primary Care Physician: Geremias Junior MD                                           NURSING DISCHARGE NOTE     Discharged Home via Wheelchair.  Accompanied by Support staff  Belongings Taken by patient/family.     Patient cleared for discharge by hospitalist, pulmonology, cardiology, and PT/OT. IV removed. Patient and patients wife educated on all AVS discharge information, all questions answered. Instructed to make [facility] for Eli[facility] and HHC. Instructed to make all follow up appointments. Meds to beds delievered. Patient brought down to [facility] with all belongings and paperwork to be driven home by spouse.             Electronically signed by Monica Marti RN at 6/9/2025  2:40 PM

## 2025-06-11 ENCOUNTER — PATIENT OUTREACH (OUTPATIENT)
Dept: CASE MANAGEMENT | Age: 85
End: 2025-06-11

## 2025-06-11 NOTE — PROGRESS NOTES
Transitional Care Management   Discharge Date: 25  Contact Date: 6/10/2025    Assessment:  TCM Initial Assessment    General:  Assessment completed with: Patient  Patient Subjective: Pt doing better.  Chief Complaint: Community acquired pneumonia of left upper lobe of lung  Verify patient name and  with patient/ caregiver: Yes    Hospital Stay/Discharge:  Prior to leaving the hospital were your Discharge Instructions reviewed with you?: Yes  Did you receive a copy of your written Discharge Instructions?: Yes  Do you feel better or worse since you left the hospital or emergency department?: Better    Follow - Up Appointment:  Do you have a follow-up appointment?: No    Home Health/DME:  Prior to leaving the hospital was Home Health (HH) arranged for you?: No     Prior to leaving the hospital or emergency department was Durable Medical Equipment (DME), medical supplies, or infusions arranged for you?: No  Are DME/medical supply/infusions needs identified by staff during this assessment?: No     Medications/Diet:       Were you given a different diet per your Discharge Instructions?: No     Questions/Concerns:  Do you have any questions or concerns that have not been discussed?: No       Follow-up Appointments:      Transitional Care Clinic  Was TCC Ordered: Yes  Was TCC Scheduled: No, Explain following [facility].       Primary Care Provider (If no TCC appointment)  Does patient already have a PCP appointment scheduled? No  Care Manager Attempted to schedule PCP office TCM appointment with patient   -If no appointment scheduled: Explain see above.     Specialist  Does the patient have any other follow-up appointment(s) that need to be scheduled? Yes   -If yes: Care Manager reviewed upcoming specialist appointments with patient: Yes   -Does the patient need assistance scheduling appointment(s): No      Book By Date: 25

## 2025-06-11 NOTE — PROGRESS NOTES
Hospital Follow up for  (Discharge 6/9 edw)     [facility]   [address], [address]  [phone]   Attempt #1:  Left message on voicemail for patient to call transitions specialist back to schedule follow up appointments. Provided Transitions specialist scheduling phone number [phone].

## 2025-06-12 NOTE — PROGRESS NOTES
JAMESVM for patient to call office regarding surgery and starting immunotherapy.  [facility] TCM talked to patient; per TCM notes, patient following up with [facility]  Closing encounter

## 2025-07-28 ENCOUNTER — HOSPITAL ENCOUNTER (OUTPATIENT)
Dept: INTERVENTIONAL RADIOLOGY/VASCULAR | Facility: HOSPITAL | Age: 85
Discharge: HOME | End: 2025-07-28
Attending: INTERNAL MEDICINE | Admitting: INTERNAL MEDICINE

## 2025-07-28 VITALS
DIASTOLIC BLOOD PRESSURE: 52 MMHG | BODY MASS INDEX: 32.51 KG/M2 | HEART RATE: 47 BPM | RESPIRATION RATE: 12 BRPM | HEIGHT: 72 IN | WEIGHT: 240 LBS | SYSTOLIC BLOOD PRESSURE: 118 MMHG | TEMPERATURE: 97 F | OXYGEN SATURATION: 94 %

## 2025-07-28 DIAGNOSIS — I48.91 A-FIB (HCC): ICD-10-CM

## 2025-07-28 LAB
ATRIAL RATE: 52 BPM
P AXIS: 65 DEGREES
P-R INTERVAL: 256 MS
Q-T INTERVAL: 438 MS
QRS DURATION: 102 MS
QTC CALCULATION (BEZET): 444 MS
R AXIS: -22 DEGREES
T AXIS: 37 DEGREES
VENTRICULAR RATE: 62 BPM

## 2025-07-28 PROCEDURE — 93005 ELECTROCARDIOGRAM TRACING: CPT

## 2025-07-28 PROCEDURE — 92960 CARDIOVERSION ELECTRIC EXT: CPT | Performed by: INTERNAL MEDICINE

## 2025-07-28 PROCEDURE — 93010 ELECTROCARDIOGRAM REPORT: CPT | Performed by: INTERNAL MEDICINE

## 2025-07-28 PROCEDURE — 99152 MOD SED SAME PHYS/QHP 5/>YRS: CPT | Performed by: INTERNAL MEDICINE

## 2025-07-28 RX ORDER — METHOHEXITAL IN WATER/PF 100MG/10ML
100 SYRINGE (ML) INTRAVENOUS ONCE
Status: DISCONTINUED | OUTPATIENT
Start: 2025-07-28 | End: 2025-07-28 | Stop reason: HOSPADM

## 2025-07-28 RX ORDER — METHOHEXITAL IN WATER/PF 100MG/10ML
SYRINGE (ML) INTRAVENOUS
Status: COMPLETED
Start: 2025-07-28 | End: 2025-07-28

## 2025-07-28 RX ORDER — SODIUM CHLORIDE 9 MG/ML
INJECTION, SOLUTION INTRAVENOUS CONTINUOUS
Status: DISCONTINUED | OUTPATIENT
Start: 2025-07-28 | End: 2025-07-28

## 2025-07-28 RX ADMIN — METHOHEXITAL IN WATER/PF 40 MG: 100MG/10ML SYRINGE (ML) INTRAVENOUS at 12:31:00

## 2025-07-28 RX ADMIN — SODIUM CHLORIDE: 9 INJECTION, SOLUTION INTRAVENOUS at 11:20:00
